# Patient Record
Sex: FEMALE | Race: WHITE | Employment: FULL TIME | URBAN - METROPOLITAN AREA
[De-identification: names, ages, dates, MRNs, and addresses within clinical notes are randomized per-mention and may not be internally consistent; named-entity substitution may affect disease eponyms.]

---

## 2017-01-05 ENCOUNTER — CLINICAL SUPPORT (OUTPATIENT)
Dept: CARDIOLOGY CLINIC | Age: 67
End: 2017-01-05

## 2017-01-05 DIAGNOSIS — E78.5 HYPERLIPIDEMIA, UNSPECIFIED HYPERLIPIDEMIA TYPE: ICD-10-CM

## 2017-01-05 DIAGNOSIS — R07.9 CHEST PAIN, UNSPECIFIED TYPE: Primary | ICD-10-CM

## 2017-01-05 DIAGNOSIS — I10 ESSENTIAL HYPERTENSION: ICD-10-CM

## 2017-01-11 ENCOUNTER — OFFICE VISIT (OUTPATIENT)
Dept: INTERNAL MEDICINE CLINIC | Age: 67
End: 2017-01-11

## 2017-01-11 VITALS
HEIGHT: 64 IN | RESPIRATION RATE: 16 BRPM | OXYGEN SATURATION: 98 % | HEART RATE: 66 BPM | TEMPERATURE: 98.6 F | SYSTOLIC BLOOD PRESSURE: 127 MMHG | WEIGHT: 158.4 LBS | DIASTOLIC BLOOD PRESSURE: 70 MMHG | BODY MASS INDEX: 27.04 KG/M2

## 2017-01-11 DIAGNOSIS — M25.511 ACUTE PAIN OF RIGHT SHOULDER: ICD-10-CM

## 2017-01-11 DIAGNOSIS — K51.80 OTHER ULCERATIVE COLITIS WITHOUT COMPLICATION (HCC): Chronic | ICD-10-CM

## 2017-01-11 DIAGNOSIS — I10 ESSENTIAL HYPERTENSION: Primary | ICD-10-CM

## 2017-01-11 DIAGNOSIS — E78.5 HYPERLIPIDEMIA, UNSPECIFIED HYPERLIPIDEMIA TYPE: ICD-10-CM

## 2017-01-11 RX ORDER — DICLOFENAC SODIUM 75 MG/1
75 TABLET, DELAYED RELEASE ORAL 2 TIMES DAILY
Qty: 601 TAB | Refills: 1 | Status: SHIPPED | OUTPATIENT
Start: 2017-01-11 | End: 2018-06-13

## 2017-01-11 NOTE — PROGRESS NOTES
HISTORY OF PRESENT ILLNESS  Samantha Shelby is a 74 y.o. female. HPI     She complains of right shoulder pain for the past 6-8 weeks. Reports it started with ache and weakness of right shoulder. She states that ache radiates down to right elbow. She does not think position of neck affects her pain. She states pain is gradually worsening and she thinks ROM is affected. Pt states her right arm feels better when she holds it closer to her body. Reports pain radiates to back as well. She describes pain as tooth ache and has used heating pads. Pt denies injury. Reports Ibuprofen takes the dull ache away. Hypertension ROS: taking medications as instructed, no medication side effects noted, no TIA's, no chest pain on exertion, no dyspnea on exertion, no swelling of ankles. New concerns: Stable- bp was 127/70 in the office today. Hyperlipidemia:  Cardiovascular risk analysis - 74 y.o. female LDL goal is under 130. ROS: taking medications as instructed, no medication side effects noted, no TIA's, no chest pain on exertion, no dyspnea on exertion, no swelling of ankles. Tolerating meds, no myalgias or other side effects noted  New concerns: Stable. Ulcerative Colitis- She is not taking any medications for Ulcerative colitis. Review of Systems   All other systems reviewed and are negative. Physical Exam   Constitutional: She is oriented to person, place, and time. She appears well-developed and well-nourished. HENT:   Head: Normocephalic and atraumatic. Right Ear: External ear normal.   Left Ear: External ear normal.   Nose: Nose normal.   Mouth/Throat: Oropharynx is clear and moist.   Eyes: Conjunctivae and EOM are normal.   Neck: Normal range of motion. Neck supple. Carotid bruit is not present. No thyroid mass and no thyromegaly present. Cardiovascular: Normal rate, regular rhythm, S1 normal, S2 normal, normal heart sounds and intact distal pulses.     Pulmonary/Chest: Effort normal and breath sounds normal.   Abdominal: Soft. Normal appearance and bowel sounds are normal. There is no hepatosplenomegaly. There is no tenderness. Musculoskeletal:        Right shoulder: She exhibits decreased range of motion and tenderness. Neurological: She is alert and oriented to person, place, and time. She has normal strength. No cranial nerve deficit or sensory deficit. Coordination normal.   Skin: Skin is warm, dry and intact. No abrasion and no rash noted. Psychiatric: She has a normal mood and affect. Her behavior is normal. Judgment and thought content normal.   Nursing note and vitals reviewed. ASSESSMENT and PLAN  Yasmine Solano was seen today for shoulder pain. Diagnoses and all orders for this visit:    Essential hypertension  BP is at goal. I do not recommend any change in medications. Hyperlipidemia, unspecified hyperlipidemia type  Stable- Continue current medications. Other ulcerative colitis without complication (HCC)  Stable and not currently taking any medications- she is followed by Dr. Kayla Jaimes. Acute pain of right shoulder  I wrote pt a prescription for physical therapy to start and suggested she see ortho. She describes pain over the tendons. Ortho may be able to find the right area to inject if this is recommended and will help her do therapy. Pt needs to continue working on ROM to avoid developing frozen shoulder. She will begin taking Voltaren 75 mg BID as an antiinflammatory- this may cause nausea and should be taken with food. I referred pt to Dr. Michele Wolf or Dr. Tatiana Gonzalez.  -     Maury Coats    Other orders  -     diclofenac EC (VOLTAREN) 75 mg EC tablet; Take 1 Tab by mouth two (2) times a day.     reviewed medications and side effects in detail     Written by Wai Bowen, as dictated by Dave Cohen MD.

## 2017-03-20 ENCOUNTER — DOCUMENTATION ONLY (OUTPATIENT)
Dept: CARDIOLOGY CLINIC | Age: 67
End: 2017-03-20

## 2017-03-20 NOTE — PROGRESS NOTES
Patient Listed on [facility] report. Sees Dr. Carol Thakkar- has appointment in April 2017.

## 2017-07-06 ENCOUNTER — OFFICE VISIT (OUTPATIENT)
Dept: CARDIOLOGY CLINIC | Age: 67
End: 2017-07-06

## 2017-07-06 ENCOUNTER — CLINICAL SUPPORT (OUTPATIENT)
Dept: CARDIOLOGY CLINIC | Age: 67
End: 2017-07-06

## 2017-07-06 VITALS
HEART RATE: 62 BPM | BODY MASS INDEX: 27.14 KG/M2 | HEIGHT: 64 IN | WEIGHT: 159 LBS | DIASTOLIC BLOOD PRESSURE: 66 MMHG | SYSTOLIC BLOOD PRESSURE: 118 MMHG

## 2017-07-06 DIAGNOSIS — I65.23 CAROTID STENOSIS, BILATERAL: Primary | ICD-10-CM

## 2017-07-06 DIAGNOSIS — E78.5 HYPERLIPIDEMIA, UNSPECIFIED HYPERLIPIDEMIA TYPE: Primary | ICD-10-CM

## 2017-07-06 DIAGNOSIS — I10 ESSENTIAL HYPERTENSION: ICD-10-CM

## 2017-07-06 RX ORDER — ROSUVASTATIN CALCIUM 10 MG/1
10 TABLET, COATED ORAL
Qty: 30 TAB | Refills: 0
Start: 2017-07-06 | End: 2018-06-13 | Stop reason: SDUPTHER

## 2017-07-06 RX ORDER — ATORVASTATIN CALCIUM 10 MG/1
TABLET, FILM COATED ORAL DAILY
COMMUNITY
End: 2017-07-06

## 2017-07-06 RX ORDER — CYANOCOBALAMIN (VITAMIN B-12) 500 MCG
600 TABLET ORAL DAILY
COMMUNITY

## 2017-07-06 NOTE — PATIENT INSTRUCTIONS

## 2017-07-06 NOTE — PROCEDURES
[facility]  *** FINAL REPORT ***    Name: Josiah Mei  MRN: [id number]       Outpatient  : 1950  HIS Order #: [id number]  [id number] [facility] Visit #: [id number]  Date: 2017    TYPE OF TEST: Cerebrovascular Duplex    REASON FOR TEST  Known carotid stenosis    Right Carotid:-             Proximal               Mid                 Distal  cm/s  Systolic  Diastolic  Systolic  Diastolic  Systolic  Diastolic  CCA:     55.1      11.0                            55.0      13.0  Bulb:    57.0      18.0  ECA:     81.0       7.0  ICA:     67.0      20.0       72.0      23.0      105.0      28.0  ICA/CCA:  1.2       1.5    ICA Stenosis: Normal    Right Vertebral:-  Finding: Antegrade  Sys:       59.0  Kelly:       18.0    Right Subclavian:    Left Carotid:-            Proximal                Mid                 Distal  cm/s  Systolic  Diastolic  Systolic  Diastolic  Systolic  Diastolic  CCA:     66.2      17.0                            78.0      17.0  Bulb:    77.0      14.0  ECA:     68.0       7.0  ICA:     66.0      17.0       73.0      21.0       92.0      26.0  ICA/CCA:  0.8       1.0    ICA Stenosis: <50%    Left Vertebral:-  Finding: Antegrade  Sys:       80.0  Kelly:       17.0    Left Subclavian:    INTERPRETATION/FINDINGS  PROCEDURE:  Evaluation of the extracranial cerebrovascular arteries  with ultrasound (B-mode imaging, pulsed Doppler, color Doppler). Includes the common carotid, internal carotid, external carotid, and  vertebral arteries. FINDINGS:    Right:  Trivial plaque is seen within the proximal internal carotid  artery. No significant stenosis is appreciated on color flow imaging  and peak systolic velocities are normal at 105 cm/s. Left:  Mild, partially calcified plaque is exhibited at the  bifurcation of the internal carotid artery. No appreciable filling  defect is noted on color flow imaging and peak systolic velocities are   normal 92 cm/s.     IMPRESSION: Findings are consistent with 0-9% stenosis of the right  internal carotid and 10-49% stenosis of the left internal carotid. Vertebrals are patent with antegrade flow. COMPARISON: In comparison to the previous study done on 11/5/2014,  there is no evidence of a significant progression of stenosis on  today's exam. Preliminary findings were discussed with the patient at  an associated office visit,    ADDITIONAL COMMENTS    I have personally reviewed the data relevant to the interpretation of  this  study.     TECHNOLOGIST: YASMEEN Spaulding  Signed: 07/06/2017 01:43 PM    PHYSICIAN: Jessica Celaya MD, [facility]  Signed: 07/06/2017 06:12 PM

## 2017-07-06 NOTE — PROGRESS NOTES
Vannesa Hugehs MD. [facility] [place]              Patient: Robin Benoit  : 1950      Today's Date: 2017            HISTORY OF PRESENT ILLNESS:     History of Present Illness:  Ms. Malia Wong is here for follow-up. No CP or SOB. No complaints. She is tolerating meds. PAST MEDICAL HISTORY:     Past Medical History:   Diagnosis Date    Agatston coronary artery calcium score less than 100     CT Heart scan  - CAC score 4    Cancer ([address] Left    breast, s/p lumpectomy    Dyslipidemia     HTN (hypertension)     Malignant melanoma nos Left    arm    Mild carotid artery disease (HCC)     Nausea & vomiting     Nausea only    Ulcerative colitis          Past Surgical History:   Procedure Laterality Date    COLONOSCOPY N/A 2016    COLONOSCOPY performed by Ani Holt MD at [facility], COLON, DIAGNOSTIC      2009 polyp    HX BREAST LUMPECTOMY Left     lumpectomy     HX  SECTION           MEDICATIONS:     Current Outpatient Prescriptions   Medication Sig Dispense Refill    cholecalciferol (VITAMIN D3) 400 unit tab tablet Take  by mouth daily.  rosuvastatin (CRESTOR) 10 mg tablet Take 1 Tab by mouth nightly. 30 Tab 0    lisinopril-hydroCHLOROthiazide (PRINZIDE, ZESTORETIC) 20-25 mg per tablet TAKE 1 TABLET BY MOUTH EVERY DAY 30 Tab 2    amLODIPine (NORVASC) 10 mg tablet TAKE 1 TABLET BY MOUTH EVERY DAY 30 Tab 2    diclofenac EC (VOLTAREN) 75 mg EC tablet Take 1 Tab by mouth two (2) times a day. 601 Tab 1    aspirin delayed-release 81 mg tablet Take  by mouth daily.  predniSONE (DELTASONE) 5 mg tablet Take  by mouth as needed. Sliding scale with flare ups.  CALCIUM PO Take  by mouth.  COLAZAL PO Take 5 tablets by mouth daily.          Allergies   Allergen Reactions    Erythromycin Hives             SOCIAL HISTORY:     Social History   Substance Use Topics    Smoking status: Never Smoker    Smokeless tobacco: Never Used    Alcohol use No FAMILY HISTORY:     Family History   Problem Relation Age of Onset    Cancer Mother      lung    Asthma Father      emphysema    Cancer Brother      melanoma    Cancer Brother      thyroid            REVIEW OF SYSTEMS:       Review of Systems:    Constitutional: Negative for fever, chills    HEENT: Negative for vision changes.    Respiratory: Negative for cough    Cardiovascular: Negative for orthopnea, syncope, and PND.    Gastrointestinal: Negative for abdominal pain, diarrhea, or melena    Genitourinary: Negative for dysuria    Musculoskeletal: Negative for myalgias.    Skin: Negative for rash    Heme: No problems bleeding.    Neurological: Negative for speech change and focal weakness.               PHYSICAL EXAM:       Physical Exam:   Visit Vitals    /66    Pulse 62    Ht 5' 4\" (1.626 m)    Wt 159 lb (72.1 kg)    BMI 27.29 kg/m2       Patient appears generally well, mood and affect are appropriate and pleasant.    HEENT: Normocephalic, atraumatic.    Neck Exam: Supple, right neck bruit    Lung Exam: Clear to auscultation, even breath sounds.    Cardiac Exam: Regular rate and rhythm with 2/6 systolic murmur (radiating to carotids)  Abdomen: Soft, non-tender  Extremities: No lower extremity edema.    Psych - appropriate affect               LABS / OTHER STUDIES:       Labs 11/21/14 - chol 284, , HDL 73, , Apo B 146, LDL-P 2626, CRP 10, proBNP 70, ASA works 5851, Cr 0.8, A1c 5, K 4.5, LFT's OK  Labs 4/9/15 - chol 199, LDL 97, , HDL 79, ApoB 83, LDL-P 1491, CRP 8, ASAworks 4623, insulin 11, Vit D 41, glc 86, Cr 0.8,   Labs 4/28/16 - chol 180, LDL 86, HDL 71, , Apo B 75, LDL-P 1281, CRP 17, ASAworks 1313, CMP OK           Lab Results   Component Value Date/Time     Sodium 143 11/14/2016 08:46 AM     Potassium 4.8 11/14/2016 08:46 AM     Chloride 100 11/14/2016 08:46 AM     CO2 24 11/14/2016 08:46 AM     Anion gap 18 04/12/2016 08:00 AM     Glucose 83 11/14/2016 08:46 AM     BUN 20 11/14/2016 08:46 AM     Creatinine 0.78 11/14/2016 08:46 AM     BUN/Creatinine ratio 26 11/14/2016 08:46 AM     GFR est AA 90 11/14/2016 08:46 AM     GFR est non-AA 78 11/14/2016 08:46 AM     Calcium 9.9 11/14/2016 08:46 AM     Bilirubin, total 0.3 11/14/2016 08:46 AM     ALT 10 11/14/2016 08:46 AM     AST 19 11/14/2016 08:46 AM     Alk. phosphatase 71 11/14/2016 08:46 AM     Protein, total 7.2 11/14/2016 08:46 AM     Albumin 4.6 11/14/2016 08:46 AM     Globulin 3.3 08/13/2010 06:54 PM     A-G Ratio 1.8 11/14/2016 08:46 AM            Lab Results   Component Value Date/Time     Cholesterol, total 182 11/14/2016 08:46 AM     HDL Cholesterol 69 11/14/2016 08:46 AM     LDL, calculated 91 11/14/2016 08:46 AM     VLDL, calculated 22 11/14/2016 08:46 AM     Triglyceride 108 11/14/2016 08:46 AM     CHOL/HDL Ratio 4.3 08/13/2010 06:54 PM             CARDIAC DIAGNOSTICS:       Cardiac Evaluation Includes:    CT Heart Scan 7/11 - CAC score 6    CT Heart scan 8/14 - CAC score 4  Carotid Dopplers 11/5/14 - 0-9% JEREMIAH and 12-46% LICA stenosis    Stress Echo 11/5/14 - walked 5 min. Normal stress EKG and stress Echo. Additional echo findings showed normal RV size and function, normal AV, MV, and TV. Trace MR and TR. PA pressure was 40 mmHg at rest and 45 mmHg after peak exercise. Echo 12/18/16 - LVEF 55-60%, AV thickening. Atrial septal aneurysm.    Treadmill Stress Test 1/5/17 - walked 4:04 (7.0 METS), normal study   Carotid Dopplers 7/6/17 - 0-9% JEREMIAH and 96-46% LICA stenosis                EKG 9/14 (reviewed by me) - NSR, Non-specific ST changes    EKG 4/28/16 - NSR, normal   EKG 12/17/16 - no acute process         ASSESSMENT AND PLAN:       Assessment and Plan:    1) History of atypical chest pain    - Very low CAC score (4) in 2014  - Stress test 1/17 was normal.   - No complaints of chest pain recently       2) Carotid Bruit    - Carotid Dopplers show stable, mild disease - 0-9% JEREMIAH and 46-26% LICA stenosis    - on a statin now      3) Dyslipidemia -    - Ms. Rain Story did initially not want to take a statin despite high lipids for years. Fortunately her CAC score has been low. Her [place] risk score gave her 8.2% 10 year CV risk.    - After much discussion, she agreed to take a statin and is tolerating it.     - Continue Crestor at 10 mg daily and ASA  - Lipids 11/16 look OK  - encouraged healthy diet and exercise      4) See me back in one year. Patient expressed understanding of the plan - questions were answered. On a side note, she has 2 kids. She is from [place]. She works for a .           Josefina Marshall MD, [facility] [address]  [address] [place] [address]  Ph: [phone]   Ph [phone]           ADDENDUM   12/21/2016  W records reviewed. Troponins negative. No ischemic EKG changes.

## 2017-08-04 RX ORDER — LISINOPRIL AND HYDROCHLOROTHIAZIDE 20; 25 MG/1; MG/1
TABLET ORAL
Qty: 90 TAB | Refills: 0 | Status: SHIPPED | OUTPATIENT
Start: 2017-08-04 | End: 2017-11-01 | Stop reason: SDUPTHER

## 2017-08-04 RX ORDER — AMLODIPINE BESYLATE 10 MG/1
TABLET ORAL
Qty: 30 TAB | Refills: 0 | Status: SHIPPED | OUTPATIENT
Start: 2017-08-04 | End: 2017-08-04 | Stop reason: SDUPTHER

## 2017-08-04 RX ORDER — LISINOPRIL AND HYDROCHLOROTHIAZIDE 20; 25 MG/1; MG/1
TABLET ORAL
Qty: 30 TAB | Refills: 0 | Status: SHIPPED | OUTPATIENT
Start: 2017-08-04 | End: 2017-08-04 | Stop reason: SDUPTHER

## 2017-08-04 RX ORDER — AMLODIPINE BESYLATE 10 MG/1
TABLET ORAL
Qty: 90 TAB | Refills: 0 | Status: SHIPPED | OUTPATIENT
Start: 2017-08-04 | End: 2017-11-01 | Stop reason: SDUPTHER

## 2017-12-13 ENCOUNTER — HOSPITAL ENCOUNTER (OUTPATIENT)
Dept: LAB | Age: 67
Discharge: HOME | End: 2017-12-13
Payer: MEDICARE

## 2017-12-13 ENCOUNTER — OFFICE VISIT (OUTPATIENT)
Dept: INTERNAL MEDICINE CLINIC | Age: 67
End: 2017-12-13

## 2017-12-13 VITALS
SYSTOLIC BLOOD PRESSURE: 132 MMHG | BODY MASS INDEX: 27.14 KG/M2 | HEIGHT: 64 IN | DIASTOLIC BLOOD PRESSURE: 72 MMHG | HEART RATE: 78 BPM | RESPIRATION RATE: 14 BRPM | WEIGHT: 159 LBS | TEMPERATURE: 98.4 F | OXYGEN SATURATION: 98 %

## 2017-12-13 DIAGNOSIS — I10 ESSENTIAL HYPERTENSION: ICD-10-CM

## 2017-12-13 DIAGNOSIS — Z78.0 POST-MENOPAUSAL: ICD-10-CM

## 2017-12-13 DIAGNOSIS — Z00.00 MEDICARE ANNUAL WELLNESS VISIT, SUBSEQUENT: Primary | ICD-10-CM

## 2017-12-13 DIAGNOSIS — K51.80 OTHER ULCERATIVE COLITIS WITHOUT COMPLICATION (HCC): Chronic | ICD-10-CM

## 2017-12-13 DIAGNOSIS — E78.5 HYPERLIPIDEMIA, UNSPECIFIED HYPERLIPIDEMIA TYPE: ICD-10-CM

## 2017-12-13 PROCEDURE — 80053 COMPREHEN METABOLIC PANEL: CPT

## 2017-12-13 PROCEDURE — 36415 COLL VENOUS BLD VENIPUNCTURE: CPT

## 2017-12-13 PROCEDURE — 85027 COMPLETE CBC AUTOMATED: CPT

## 2017-12-13 PROCEDURE — 80061 LIPID PANEL: CPT

## 2017-12-13 RX ORDER — LISINOPRIL AND HYDROCHLOROTHIAZIDE 20; 25 MG/1; MG/1
1 TABLET ORAL DAILY
Qty: 90 TAB | Refills: 1 | Status: SHIPPED | OUTPATIENT
Start: 2017-12-13 | End: 2018-06-01 | Stop reason: SDUPTHER

## 2017-12-13 RX ORDER — AMLODIPINE BESYLATE 10 MG/1
10 TABLET ORAL DAILY
Qty: 90 TAB | Refills: 1 | Status: SHIPPED | OUTPATIENT
Start: 2017-12-13 | End: 2018-06-01 | Stop reason: SDUPTHER

## 2017-12-13 NOTE — PROGRESS NOTES
HISTORY OF PRESENT ILLNESS  Marge Burrell is a 69 y.o. female. HPI   Patient reports bowel movements have been normal. She denies blood or darker stool. She continues to follow up for colonoscopies every 2-3 years. Hypertension ROS: taking medications as instructed, no medication side effects noted, no TIA's, no chest pain on exertion, no dyspnea on exertion, no swelling of ankles. New concerns:  Patient's BP in office today is 132/72. Patient states BP has been overall stable and has not been fluctuating. She denies SOB or chest tightness. Patient continues Lisinopril/HCTZ and amlodipine. Hyperlipidemia:  Cardiovascular risk analysis - 69 y.o. female LDL goal is under 130. ROS: taking medications as instructed, no medication side effects noted, no TIA's, no chest pain on exertion, no dyspnea on exertion, no swelling of ankles. Tolerating meds, no myalgias or other side effects noted  New concerns: Patient continues Crestor. Last LDL was 91. Review of Systems   All other systems reviewed and are negative. Physical Exam   Constitutional: She is oriented to person, place, and time. She appears well-developed and well-nourished. HENT:   Head: Normocephalic and atraumatic. Right Ear: External ear normal.   Left Ear: External ear normal.   Nose: Nose normal.   Mouth/Throat: Oropharynx is clear and moist.   Fluid in ears bilaterally. Eyes: Conjunctivae and EOM are normal.   Neck: Normal range of motion. Neck supple. Carotid bruit is not present. No thyroid mass and no thyromegaly present. Cardiovascular: Normal rate, regular rhythm, S1 normal, S2 normal, normal heart sounds and intact distal pulses. Pulmonary/Chest: Effort normal and breath sounds normal.   Abdominal: Soft. Normal appearance and bowel sounds are normal. There is no hepatosplenomegaly. There is no tenderness. Musculoskeletal: Normal range of motion. Neurological: She is alert and oriented to person, place, and time.  She has normal strength. No cranial nerve deficit or sensory deficit. Coordination normal.   Skin: Skin is warm, dry and intact. No abrasion and no rash noted. Psychiatric: She has a normal mood and affect. Her behavior is normal. Judgment and thought content normal.   Nursing note and vitals reviewed. ASSESSMENT and PLAN  Diagnoses and all orders for this visit:    1. Medicare annual wellness visit, subsequent    2. Hyperlipidemia, unspecified hyperlipidemia type  Stable, patient tolerating meds, no myalgias. I do not recommend any change in medications. Continue to follow up with cardiology. -     lisinopril-hydroCHLOROthiazide (PRINZIDE, ZESTORETIC) 20-25 mg per tablet; Take 1 Tab by mouth daily. -     amLODIPine (NORVASC) 10 mg tablet; Take 1 Tab by mouth daily.  -     METABOLIC PANEL, COMPREHENSIVE  -     LIPID PANEL    3. Essential hypertension  BP is at goal. I do not recommend any change in medications. 4. Other ulcerative colitis without complication ([facility]  Overall well managed. No acute blood in stool, continue to follow up with GI.   -     CBC W/O DIFF    5. Post-menopausal  Patient due for imaging and will follow up. -     DEXA BONE DENSITY STUDY AXIAL; Future    When she turns 77, patient will consider pneumonia vaccines. lab results and schedule of future lab studies reviewed with patient  reviewed diet, exercise and weight control    Written by Thao Perez, as dictated by Quinten Roper MD.     Current diagnosis and concerns discussed with pt at length. Understands risks and benefits or current treatment plan and medications and accepts the treatment and medication with any possible risks. Pt asks appropriate questions which were answered. Pt instructed to call with any concerns or problems.

## 2017-12-13 NOTE — PATIENT INSTRUCTIONS

## 2017-12-13 NOTE — ACP (ADVANCE CARE PLANNING)
Advance Care Planning (ACP) Provider Note - Comprehensive     Date of ACP Conversation: 12/13/17  Persons included in Conversation:  patient  Length of ACP Conversation in minutes:  <16 minutes (Non-Billable)    Authorized Decision Maker (if patient is incapable of making informed decisions): This person is: Other Legally Authorized Decision Maker (e.g. Next of Kin)          General ACP for ALL Patients with Decision Making Capacity:   Importance of advance care planning, including choosing a healthcare agent to communicate patient's healthcare decisions if patient lost the ability to make decisions, such as after a sudden illness or accident    Review of Existing Advance Directive:  Does this advance directive still reflect your preferences?   Yes (Provide new form/Refer for assistance in updating)    For Serious or Chronic Illness:  No known illness    Interventions Provided:  Reviewed existing Advance Directive

## 2017-12-13 NOTE — PROGRESS NOTES
This is a Subsequent Medicare Annual Wellness Exam (AWV) (Performed 12 months after IPPE or effective date of Medicare Part B enrollment)    I have reviewed the patient's medical history in detail and updated the computerized patient record. History     Past Medical History:   Diagnosis Date    Agatston coronary artery calcium score less than 100     CT Heart scan  - CAC score 4    Cancer ([address] Left    breast, s/p lumpectomy    Dyslipidemia     HTN (hypertension)     Malignant melanoma nos Left    arm    Mild carotid artery disease (HCC)     Nausea & vomiting     Nausea only    Ulcerative colitis       Past Surgical History:   Procedure Laterality Date    COLONOSCOPY N/A 2016    COLONOSCOPY performed by Margot Arndt MD at [address] [place] [address] [place], COLON, DIAGNOSTIC       polyp    HX BREAST LUMPECTOMY Left     lumpectomy     HX  SECTION       Current Outpatient Prescriptions   Medication Sig Dispense Refill    lisinopril-hydroCHLOROthiazide (PRINZIDE, ZESTORETIC) 20-25 mg per tablet TAKE 1 TABLET BY MOUTH EVERY DAY 14 Tab 0    amLODIPine (NORVASC) 10 mg tablet TAKE 1 TABLET BY MOUTH EVERY DAY 14 Tab 0    cholecalciferol (VITAMIN D3) 400 unit tab tablet Take  by mouth daily.  rosuvastatin (CRESTOR) 10 mg tablet Take 1 Tab by mouth nightly. 30 Tab 0    diclofenac EC (VOLTAREN) 75 mg EC tablet Take 1 Tab by mouth two (2) times a day. 601 Tab 1    aspirin delayed-release 81 mg tablet Take  by mouth daily.  predniSONE (DELTASONE) 5 mg tablet Take  by mouth as needed. Sliding scale with flare ups.  CALCIUM PO Take  by mouth.  COLAZAL PO Take 5 tablets by mouth daily.       rosuvastatin (CRESTOR) 10 mg tablet TAKE 1 TABLET BY MOUTH DAILY 30 Tab 8     Allergies   Allergen Reactions    Erythromycin Hives     Family History   Problem Relation Age of Onset    Cancer Mother      lung    Asthma Father      emphysema    Cancer Brother      melanoma    Cancer Brother      thyroid     Social History   Substance Use Topics    Smoking status: Never Smoker    Smokeless tobacco: Never Used    Alcohol use No     Patient Active Problem List   Diagnosis Code    Breast cancer ([facility] C50.919    Hyperlipidemia E78.5    Ulcerative colitis ([facility] K51.90    Mild carotid artery disease ([facility] I77.9    Essential hypertension I10    ACP (advance care planning) Z71.89       Depression Risk Factor Screening:     PHQ over the last two weeks 1/27/2016   Little interest or pleasure in doing things Not at all   Feeling down, depressed or hopeless Not at all   Total Score PHQ 2 0     Alcohol Risk Factor Screening: You do not drink alcohol or very rarely. Functional Ability and Level of Safety:   Hearing Loss  Hearing is good. Activities of Daily Living  The home contains: no safety equipment. Patient does total self care    Fall RiskFall Risk Assessment, last 12 mths 1/27/2016   Able to walk? Yes   Fall in past 12 months? No       Abuse Screen  Patient is not abused    Cognitive Screening   Evaluation of Cognitive Function:  Has your family/caregiver stated any concerns about your memory: no  Normal    Patient Care Team   Patient Care Team:  Abhay Cabral MD as PCP - Neeru Oliva MD (Gastroenterology)  Ziyad Anders MD as Consulting Provider (Cardiology)  Ziyad Anders MD (Cardiology)    Assessment/Plan   Education and counseling provided:  Are appropriate based on today's review and evaluation  End-of-Life planning (with patient's consent)     She did promise pneumonia shot when she turns 77! Diagnoses and all orders for this visit:    1. Medicare annual wellness visit, subsequent    2. Hyperlipidemia, unspecified hyperlipidemia type    3. Essential hypertension    4.  Other ulcerative colitis without complication [facility])        Health Maintenance Due   Topic Date Due    Pneumococcal 65+ High/Highest Risk (2 of 2 - PPSV23) 01/09/2017    Influenza Age 9 to Adult  08/01/2017    MEDICARE YEARLY EXAM  11/15/2017

## 2017-12-14 LAB
ALBUMIN SERPL-MCNC: 4.9 G/DL (ref 3.6–4.8)
ALBUMIN/GLOB SERPL: 2 {RATIO} (ref 1.2–2.2)
ALP SERPL-CCNC: 69 IU/L (ref 39–117)
ALT SERPL-CCNC: 9 IU/L (ref 0–32)
AST SERPL-CCNC: 15 IU/L (ref 0–40)
BILIRUB SERPL-MCNC: 0.3 MG/DL (ref 0–1.2)
BUN SERPL-MCNC: 18 MG/DL (ref 8–27)
BUN/CREAT SERPL: 23 (ref 12–28)
CALCIUM SERPL-MCNC: 10.1 MG/DL (ref 8.7–10.3)
CHLORIDE SERPL-SCNC: 100 MMOL/L (ref 96–106)
CHOLEST SERPL-MCNC: 174 MG/DL (ref 100–199)
CO2 SERPL-SCNC: 26 MMOL/L (ref 18–29)
CREAT SERPL-MCNC: 0.78 MG/DL (ref 0.57–1)
ERYTHROCYTE [DISTWIDTH] IN BLOOD BY AUTOMATED COUNT: 14.4 % (ref 12.3–15.4)
GFR SERPLBLD CREATININE-BSD FMLA CKD-EPI: 78 ML/MIN/1.73
GFR SERPLBLD CREATININE-BSD FMLA CKD-EPI: 90 ML/MIN/1.73
GLOBULIN SER CALC-MCNC: 2.4 G/DL (ref 1.5–4.5)
GLUCOSE SERPL-MCNC: 83 MG/DL (ref 65–99)
HCT VFR BLD AUTO: 45.9 % (ref 34–46.6)
HDLC SERPL-MCNC: 74 MG/DL
HGB BLD-MCNC: 14.8 G/DL (ref 11.1–15.9)
INTERPRETATION, 910389: NORMAL
LDLC SERPL CALC-MCNC: 80 MG/DL (ref 0–99)
MCH RBC QN AUTO: 28.2 PG (ref 26.6–33)
MCHC RBC AUTO-ENTMCNC: 32.2 G/DL (ref 31.5–35.7)
MCV RBC AUTO: 87 FL (ref 79–97)
PLATELET # BLD AUTO: 368 X10E3/UL (ref 150–379)
POTASSIUM SERPL-SCNC: 4.6 MMOL/L (ref 3.5–5.2)
PROT SERPL-MCNC: 7.3 G/DL (ref 6–8.5)
RBC # BLD AUTO: 5.25 X10E6/UL (ref 3.77–5.28)
SODIUM SERPL-SCNC: 145 MMOL/L (ref 134–144)
TRIGL SERPL-MCNC: 99 MG/DL (ref 0–149)
VLDLC SERPL CALC-MCNC: 20 MG/DL (ref 5–40)
WBC # BLD AUTO: 6.4 X10E3/UL (ref 3.4–10.8)

## 2018-01-09 ENCOUNTER — HOSPITAL ENCOUNTER (OUTPATIENT)
Dept: MAMMOGRAPHY | Age: 68
Discharge: HOME | End: 2018-01-09
Attending: INTERNAL MEDICINE
Payer: MEDICARE

## 2018-01-09 DIAGNOSIS — Z78.0 POST-MENOPAUSAL: ICD-10-CM

## 2018-01-09 PROCEDURE — 77080 DXA BONE DENSITY AXIAL: CPT

## 2018-03-14 ENCOUNTER — OFFICE VISIT (OUTPATIENT)
Dept: INTERNAL MEDICINE CLINIC | Age: 68
End: 2018-03-14

## 2018-03-14 VITALS
DIASTOLIC BLOOD PRESSURE: 72 MMHG | OXYGEN SATURATION: 91 % | BODY MASS INDEX: 26.8 KG/M2 | SYSTOLIC BLOOD PRESSURE: 125 MMHG | HEART RATE: 91 BPM | HEIGHT: 64 IN | TEMPERATURE: 98.1 F | WEIGHT: 157 LBS | RESPIRATION RATE: 16 BRPM

## 2018-03-14 DIAGNOSIS — R42 VERTIGO: Primary | ICD-10-CM

## 2018-03-14 DIAGNOSIS — I10 ESSENTIAL HYPERTENSION: ICD-10-CM

## 2018-03-14 RX ORDER — MECLIZINE HYDROCHLORIDE 25 MG/1
25 TABLET ORAL
Qty: 30 TAB | Refills: 0 | Status: SHIPPED | OUTPATIENT
Start: 2018-03-14 | End: 2018-04-05 | Stop reason: SDUPTHER

## 2018-03-14 NOTE — PROGRESS NOTES
HISTORY OF PRESENT ILLNESS  Waynette Hodgkins is a 69 y.o. female. HPI  Monday early woke to go to the bathroom and felt off balance, went back to bed, however when she woke up again still felt dizzy. No nausea or vomiting. No palpitations or chest pain. She's tried some Dramamine, which helps a bit . She has a mild headache, nothing severe. No blurring or diplopia. Review of Systems   Constitutional: Negative for chills, fever and weight loss. Respiratory: Negative for cough, shortness of breath and wheezing. Cardiovascular: Negative for chest pain, palpitations, orthopnea, leg swelling and PND. Gastrointestinal: Negative for heartburn and nausea. Musculoskeletal: Negative for myalgias. Neurological: Positive for dizziness. Negative for sensory change, speech change, focal weakness and headaches. Physical Exam   Constitutional: She is oriented to person, place, and time. She appears well-developed and well-nourished. HENT:   Head: Normocephalic and atraumatic. Eyes:   Horizontal nystagmus   Neck: Normal range of motion. Neck supple. Carotid bruit is not present. No thyromegaly present. Cardiovascular: Normal rate, regular rhythm, S1 normal, S2 normal, normal heart sounds and intact distal pulses. No murmur heard. Pulmonary/Chest: Effort normal and breath sounds normal. No respiratory distress. She has no wheezes. She has no rales. Musculoskeletal: She exhibits no edema. Neurological: She is alert and oriented to person, place, and time. nonfocal  Peripheral vision intact and cerebellar testing ok   Psychiatric: She has a normal mood and affect. Her behavior is normal.   Nursing note and vitals reviewed. ASSESSMENT and PLAN  Diagnoses and all orders for this visit:    1. Vertigo  -     meclizine (ANTIVERT) 25 mg tablet; Take 1 Tab by mouth three (3) times daily as needed for up to 10 days.     2. Essential hypertension    Stable  Follow up if signs and symptoms worsen or change. After hours number given.    Cautioned re sedation with the antivert

## 2018-03-14 NOTE — MR AVS SNAPSHOT
[address] [facility] 
 
 
 [address] [facility] 1007 [facility] 
[phone] Patient: Richmond Rasmussen MRN: [id number] YPC:[birth year] Visit Information Date & Time Provider Department Dept. Phone Encounter #  
 3/14/2018  1:30 PM Maria G Adams MD [facility] [facility] [id number] Your Appointments 6/13/2018  8:00 AM  
ROUTINE CARE with Adolph Sanchez MD  
[facility] [address] [place] [address]) Appt Note: 6 mo  
 [address] [facility] [address] [address]  
[phone]  
  
   
 [address] [facility] [address] 7/17/2018  8:20 AM  
ESTABLISHED PATIENT with Genesis Lopez MD  
[facility] ([facility] SCHEDULING) Appt Note: annual  
 [address] [facility] [address]7 [facility]  
[address] [facility] [address] [address] [address] [place] [address] Upcoming Health Maintenance Date Due  
 MEDICARE YEARLY EXAM 12/14/2018 BREAST CANCER SCRN MAMMOGRAM 4/19/2019 GLAUCOMA SCREENING Q2Y 5/19/2019 COLONOSCOPY 6/16/2026 DTaP/Tdap/Td series (2 - Td) 11/14/2026 Allergies as of 3/14/2018  Review Complete On: 3/14/2018 By: Maria G Adams MD  
  
 Severity Noted Reaction Type Reactions Erythromycin  02/10/2009    Hives Current Immunizations  Never Reviewed No immunizations on file. Not reviewed this visit You Were Diagnosed With   
  
 Codes Comments Vertigo    -  Primary ICD-10-CM: E82 ICD-9-CM: 780.4 Essential hypertension     ICD-10-CM: I10 
ICD-9-CM: 401.9 Vitals BP Pulse Temp Resp Height(growth percentile) Weight(growth percentile) 125/72 (BP 1 Location: Left arm, BP Patient Position: Standing) 91 98.1 °F (36.7 °C) (Oral) 16 5' 4\" (1.626 m) 157 lb (71.2 kg) SpO2 BMI OB Status Smoking Status 91% 26.95 kg/m2 Postmenopausal Never Smoker Vitals History BMI and BSA Data Body Mass Index Body Surface Area  
 26.95 kg/m 2 1.79 m 2 Preferred Pharmacy Pharmacy Name Phone [facility]ORE [address], [address] [facility] [address]  [facility] [phone] Your Updated Medication List  
  
   
This list is accurate as of 3/14/18  2:01 PM.  Always use your most recent med list. amLODIPine 10 mg tablet Commonly known as:  Cookie Boozer Take 1 Tab by mouth daily. aspirin delayed-release 81 mg tablet Take  by mouth daily. CALCIUM PO Take  by mouth. cholecalciferol 400 unit Tab tablet Commonly known as:  VITAMIN D3 Take  by mouth daily. COLAZAL PO Take 5 tablets by mouth daily. diclofenac EC 75 mg EC tablet Commonly known as:  VOLTAREN Take 1 Tab by mouth two (2) times a day. lisinopril-hydroCHLOROthiazide 20-25 mg per tablet Commonly known as:  Florian Mp Take 1 Tab by mouth daily. meclizine 25 mg tablet Commonly known as:  ANTIVERT Take 1 Tab by mouth three (3) times daily as needed for up to 10 days. predniSONE 5 mg tablet Commonly known as:  Geovanna Raider Take  by mouth as needed. Sliding scale with flare ups. * rosuvastatin 10 mg tablet Commonly known as:  CRESTOR Take 1 Tab by mouth nightly. * rosuvastatin 10 mg tablet Commonly known as:  CRESTOR  
TAKE 1 TABLET BY MOUTH DAILY * Notice: This list has 2 medication(s) that are the same as other medications prescribed for you. Read the directions carefully, and ask your doctor or other care provider to review them with you. Prescriptions Sent to Pharmacy Refills  
 meclizine (ANTIVERT) 25 mg tablet 0 Sig: Take 1 Tab by mouth three (3) times daily as needed for up to 10 days.   
 Class: Normal  
 Pharmacy: [address] [place] [address] [address], [address] [facility] [address] AT NEC of [address] [facility] #: [phone] Route: Oral  
  
Introducing hospitals & HEALTH SERVICES! Tosha Perez introduces [employer] patient portal. Now you can access parts of your medical record, email your doctor's office, and request medication refills online. 1. In your internet browser, go to [email or web address] 2. Click on the First Time User? Click Here link in the Sign In box. You will see the New Member Sign Up page. 3. Enter your [employer] Access Code exactly as it appears below. You will not need to use this code after youve completed the sign-up process. If you do not sign up before the expiration date, you must request a new code. · [employer] Access Code: [email or web address] Expires: 4/8/2018  1:12 PM 
 
4. Enter the last four digits of your Social Security Number (xxxx) and Date of Birth (mm/dd/yyyy) as indicated and click Submit. You will be taken to the next sign-up page. 5. Create a [employer] ID. This will be your [employer] login ID and cannot be changed, so think of one that is secure and easy to remember. 6. Create a [employer] password. You can change your password at any time. 7. Enter your Password Reset Question and Answer. This can be used at a later time if you forget your password. 8. Enter your e-mail address. You will receive e-mail notification when new information is available in [address] 9. Click Sign Up. You can now view and download portions of your medical record. 10. Click the Download Summary menu link to download a portable copy of your medical information. If you have questions, please visit the Frequently Asked Questions section of the [employer] website. Remember, [employer] is NOT to be used for urgent needs. For medical emergencies, dial 911. Now available from your iPhone and Android! Please provide this summary of care documentation to your next provider. Your primary care clinician is listed as Melissa Pérez. If you have any questions after today's visit, please call [phone].

## 2018-04-05 DIAGNOSIS — R42 VERTIGO: ICD-10-CM

## 2018-04-05 RX ORDER — MECLIZINE HYDROCHLORIDE 25 MG/1
TABLET ORAL
Qty: 30 TAB | Refills: 0 | Status: SHIPPED | OUTPATIENT
Start: 2018-04-05 | End: 2018-06-23 | Stop reason: SDUPTHER

## 2018-06-01 DIAGNOSIS — E78.5 HYPERLIPIDEMIA, UNSPECIFIED HYPERLIPIDEMIA TYPE: ICD-10-CM

## 2018-06-02 RX ORDER — LISINOPRIL AND HYDROCHLOROTHIAZIDE 20; 25 MG/1; MG/1
TABLET ORAL
Qty: 90 TAB | Refills: 0 | Status: SHIPPED | OUTPATIENT
Start: 2018-06-02 | End: 2018-06-13 | Stop reason: SDUPTHER

## 2018-06-02 RX ORDER — AMLODIPINE BESYLATE 10 MG/1
TABLET ORAL
Qty: 90 TAB | Refills: 0 | Status: SHIPPED | OUTPATIENT
Start: 2018-06-02 | End: 2018-06-13 | Stop reason: SDUPTHER

## 2018-06-13 ENCOUNTER — HOSPITAL ENCOUNTER (OUTPATIENT)
Dept: LAB | Age: 68
Discharge: HOME | End: 2018-06-13
Payer: MEDICARE

## 2018-06-13 ENCOUNTER — OFFICE VISIT (OUTPATIENT)
Dept: INTERNAL MEDICINE CLINIC | Age: 68
End: 2018-06-13

## 2018-06-13 VITALS
BODY MASS INDEX: 26.84 KG/M2 | HEIGHT: 64 IN | HEART RATE: 65 BPM | TEMPERATURE: 98.4 F | SYSTOLIC BLOOD PRESSURE: 128 MMHG | WEIGHT: 157.2 LBS | DIASTOLIC BLOOD PRESSURE: 72 MMHG | RESPIRATION RATE: 14 BRPM | OXYGEN SATURATION: 97 %

## 2018-06-13 DIAGNOSIS — E78.5 HYPERLIPIDEMIA, UNSPECIFIED HYPERLIPIDEMIA TYPE: ICD-10-CM

## 2018-06-13 DIAGNOSIS — K51.80 OTHER ULCERATIVE COLITIS WITHOUT COMPLICATION (HCC): Primary | Chronic | ICD-10-CM

## 2018-06-13 DIAGNOSIS — I10 ESSENTIAL HYPERTENSION: ICD-10-CM

## 2018-06-13 PROCEDURE — 36415 COLL VENOUS BLD VENIPUNCTURE: CPT

## 2018-06-13 PROCEDURE — 80061 LIPID PANEL: CPT

## 2018-06-13 PROCEDURE — 80053 COMPREHEN METABOLIC PANEL: CPT

## 2018-06-13 PROCEDURE — 85025 COMPLETE CBC W/AUTO DIFF WBC: CPT

## 2018-06-13 RX ORDER — LISINOPRIL AND HYDROCHLOROTHIAZIDE 20; 25 MG/1; MG/1
TABLET ORAL
Qty: 90 TAB | Refills: 1 | Status: SHIPPED | OUTPATIENT
Start: 2018-06-13 | End: 2019-03-11 | Stop reason: SDUPTHER

## 2018-06-13 RX ORDER — AMLODIPINE BESYLATE 10 MG/1
TABLET ORAL
Qty: 90 TAB | Refills: 1 | Status: SHIPPED | OUTPATIENT
Start: 2018-06-13 | End: 2019-01-07 | Stop reason: SDUPTHER

## 2018-06-14 LAB
ALBUMIN SERPL-MCNC: 4.4 G/DL (ref 3.6–4.8)
ALBUMIN/GLOB SERPL: 1.8 {RATIO} (ref 1.2–2.2)
ALP SERPL-CCNC: 60 IU/L (ref 39–117)
ALT SERPL-CCNC: 15 IU/L (ref 0–32)
AST SERPL-CCNC: 24 IU/L (ref 0–40)
BASOPHILS # BLD AUTO: 0.1 X10E3/UL (ref 0–0.2)
BASOPHILS NFR BLD AUTO: 1 %
BILIRUB SERPL-MCNC: 0.3 MG/DL (ref 0–1.2)
BUN SERPL-MCNC: 22 MG/DL (ref 8–27)
BUN/CREAT SERPL: 28 (ref 12–28)
CALCIUM SERPL-MCNC: 9.7 MG/DL (ref 8.7–10.3)
CHLORIDE SERPL-SCNC: 98 MMOL/L (ref 96–106)
CHOLEST SERPL-MCNC: 193 MG/DL (ref 100–199)
CO2 SERPL-SCNC: 23 MMOL/L (ref 20–29)
CREAT SERPL-MCNC: 0.79 MG/DL (ref 0.57–1)
EOSINOPHIL # BLD AUTO: 0.3 X10E3/UL (ref 0–0.4)
EOSINOPHIL NFR BLD AUTO: 3 %
ERYTHROCYTE [DISTWIDTH] IN BLOOD BY AUTOMATED COUNT: 14.4 % (ref 12.3–15.4)
GFR SERPLBLD CREATININE-BSD FMLA CKD-EPI: 77 ML/MIN/1.73
GFR SERPLBLD CREATININE-BSD FMLA CKD-EPI: 88 ML/MIN/1.73
GLOBULIN SER CALC-MCNC: 2.5 G/DL (ref 1.5–4.5)
GLUCOSE SERPL-MCNC: 73 MG/DL (ref 65–99)
HCT VFR BLD AUTO: 43.6 % (ref 34–46.6)
HDLC SERPL-MCNC: 81 MG/DL
HGB BLD-MCNC: 14.5 G/DL (ref 11.1–15.9)
IMM GRANULOCYTES # BLD: 0.1 X10E3/UL (ref 0–0.1)
IMM GRANULOCYTES NFR BLD: 1 %
INTERPRETATION, 910389: NORMAL
LDLC SERPL CALC-MCNC: 80 MG/DL (ref 0–99)
LYMPHOCYTES # BLD AUTO: 2.5 X10E3/UL (ref 0.7–3.1)
LYMPHOCYTES NFR BLD AUTO: 27 %
MCH RBC QN AUTO: 28 PG (ref 26.6–33)
MCHC RBC AUTO-ENTMCNC: 33.3 G/DL (ref 31.5–35.7)
MCV RBC AUTO: 84 FL (ref 79–97)
MONOCYTES # BLD AUTO: 0.8 X10E3/UL (ref 0.1–0.9)
MONOCYTES NFR BLD AUTO: 9 %
NEUTROPHILS # BLD AUTO: 5.4 X10E3/UL (ref 1.4–7)
NEUTROPHILS NFR BLD AUTO: 59 %
PLATELET # BLD AUTO: 431 X10E3/UL (ref 150–379)
POTASSIUM SERPL-SCNC: 4.7 MMOL/L (ref 3.5–5.2)
PROT SERPL-MCNC: 6.9 G/DL (ref 6–8.5)
RBC # BLD AUTO: 5.17 X10E6/UL (ref 3.77–5.28)
SODIUM SERPL-SCNC: 140 MMOL/L (ref 134–144)
TRIGL SERPL-MCNC: 159 MG/DL (ref 0–149)
VLDLC SERPL CALC-MCNC: 32 MG/DL (ref 5–40)
WBC # BLD AUTO: 9.2 X10E3/UL (ref 3.4–10.8)

## 2018-07-17 ENCOUNTER — OFFICE VISIT (OUTPATIENT)
Dept: CARDIOLOGY CLINIC | Age: 68
End: 2018-07-17

## 2018-07-17 VITALS — BODY MASS INDEX: 26.98 KG/M2 | HEIGHT: 64 IN | WEIGHT: 158 LBS

## 2018-07-17 DIAGNOSIS — I10 ESSENTIAL HYPERTENSION: Primary | ICD-10-CM

## 2018-07-17 DIAGNOSIS — E78.5 HYPERLIPIDEMIA, UNSPECIFIED HYPERLIPIDEMIA TYPE: ICD-10-CM

## 2018-07-17 RX ORDER — ROSUVASTATIN CALCIUM 10 MG/1
10 TABLET, COATED ORAL DAILY
Qty: 90 TAB | Refills: 3 | Status: SHIPPED | OUTPATIENT
Start: 2018-07-17 | End: 2019-07-08 | Stop reason: SDUPTHER

## 2018-07-17 NOTE — PROGRESS NOTES
Extended / Orthostatic Vitals:  Patient Position 2: Supine  BP 2: 120/60  Pulse 2: 76  Patient Position 3: Standing  BP 3: 112/72  Pulse 3: 78

## 2018-07-17 NOTE — PROGRESS NOTES
Christine Archibald MD. [facility]              Patient: Susannah Mosher  : 1950      Today's Date: 2018        HISTORY OF PRESENT ILLNESS:     History of Present Illness:  Here for follow-up. Doing well. No cardiac complaints. Had vertigo several weeks ago (room spinning). PAST MEDICAL HISTORY:     Past Medical History:   Diagnosis Date    [facility] calcium [facility] less than [facility]     CT Heart scan  - CAC score 4    Cancer ([address] Left    breast, s/p lumpectomy    Dyslipidemia     Labs 14 - chol 284, , HDL 73, , Apo B 146, LDL-P 2626, CRP 10    HTN (hypertension)     Malignant melanoma nos Left    arm    Mild carotid artery disease (HCC)     Nausea & vomiting     Nausea only    Ulcerative colitis          Past Surgical History:   Procedure Laterality Date    COLONOSCOPY N/A 2016    COLONOSCOPY performed by Deric Rice MD at [address] [place] [address], COLON, DIAGNOSTIC       polyp    HX BREAST LUMPECTOMY Left     lumpectomy     HX  SECTION             MEDICATIONS:     Current Outpatient Prescriptions   Medication Sig Dispense Refill    meclizine (ANTIVERT) 25 mg tablet TAKE 1 TABLET BY MOUTH THREE TIMES DAILY FOR UP TO 10 DAYS AS NEEDED 30 Tab 3    amLODIPine (NORVASC) 10 mg tablet TAKE 1 TABLET BY MOUTH DAILY 90 Tab 1    lisinopril-hydroCHLOROthiazide (PRINZIDE, ZESTORETIC) 20-25 mg per tablet TAKE 1 TABLET BY MOUTH ONCE DAILY 90 Tab 1    rosuvastatin (CRESTOR) 10 mg tablet TAKE 1 TABLET BY MOUTH DAILY 30 Tab 8    cholecalciferol (VITAMIN D3) 400 unit tab tablet Take  by mouth daily.  aspirin delayed-release 81 mg tablet Take  by mouth daily.  predniSONE (DELTASONE) 5 mg tablet Take  by mouth as needed. Sliding scale with flare ups.  CALCIUM PO Take  by mouth.  COLAZAL PO Take 5 tablets by mouth daily.          Allergies   Allergen Reactions    Erythromycin Hives             SOCIAL HISTORY: Social History   Substance Use Topics    Smoking status: Never Smoker    Smokeless tobacco: Never Used    Alcohol use No         FAMILY HISTORY:     Family History   Problem Relation Age of Onset    Cancer Mother      lung    Asthma Father      emphysema    Cancer Brother      melanoma    Cancer Brother      thyroid          REVIEW OF SYSTEMS:       Review of Systems:    Constitutional: Negative for fever, chills    HEENT: Negative for vision changes.    Respiratory: Negative for cough    Cardiovascular: Negative for orthopnea, syncope, and PND.    Gastrointestinal: Negative for abdominal pain, diarrhea, or melena    Genitourinary: Negative for dysuria    Musculoskeletal: Negative for myalgias.    Skin: Negative for rash    Heme: No problems bleeding.    Neurological: Negative for speech change and focal weakness.                 PHYSICAL EXAM:     Physical Exam:  Visit Vitals    Ht 5' 4\" (1.626 m)    Wt 158 lb (71.7 kg)    BMI 27.12 kg/m2     Patient appears generally well, mood and affect are appropriate and pleasant. HEENT:  Hearing intact, non-icteric, normocephalic, atraumatic. Neck Exam: Supple, No JVD   Lung Exam: Clear to auscultation, even breath sounds. Cardiac Exam: Regular rate and rhythm with 2/6 systolic murmur (radiating to carotids)  Abdomen: Soft, non-tender, normal bowel sounds. No bruits or masses. Extremities: Moves all ext well. No lower extremity edema. Vascular: 2+ dorsalis pedis pulses bilaterally.   Psych: Appropriate affect  Neuro - Grossly intact      Extended / Orthostatic Vitals:  Patient Position 2: Supine  BP 2: 120/60  Pulse 2: 76  Patient Position 3: Standing  BP 3: 112/72  Pulse 3: 78            LABS / OTHER STUDIES:       Labs 11/21/14 - chol 284, , HDL 73, , Apo B 146, LDL-P 2626, CRP 10, proBNP 70, ASA works 8768, Cr 0.8, A1c 5, K 4.5, LFT's OK    Lab Results   Component Value Date/Time    Sodium 140 06/13/2018 08:40 AM    Potassium 4.7 06/13/2018 08:40 AM    Chloride 98 06/13/2018 08:40 AM    CO2 23 06/13/2018 08:40 AM    Anion gap 18 04/12/2016 08:00 AM    Glucose 73 06/13/2018 08:40 AM    BUN 22 06/13/2018 08:40 AM    Creatinine 0.79 06/13/2018 08:40 AM    BUN/Creatinine ratio 28 06/13/2018 08:40 AM    GFR est AA 88 06/13/2018 08:40 AM    GFR est non-AA 77 06/13/2018 08:40 AM    Calcium 9.7 06/13/2018 08:40 AM    Bilirubin, total 0.3 06/13/2018 08:40 AM    AST (SGOT) 24 06/13/2018 08:40 AM    Alk. phosphatase 60 06/13/2018 08:40 AM    Protein, total 6.9 06/13/2018 08:40 AM    Albumin 4.4 06/13/2018 08:40 AM    Globulin 3.3 08/13/2010 06:54 PM    A-G Ratio 1.8 06/13/2018 08:40 AM    ALT (SGPT) 15 06/13/2018 08:40 AM       Lab Results   Component Value Date/Time    Cholesterol, total 193 06/13/2018 08:40 AM    HDL Cholesterol 81 06/13/2018 08:40 AM    LDL, calculated 80 06/13/2018 08:40 AM    VLDL, calculated 32 06/13/2018 08:40 AM    Triglyceride 159 (H) 06/13/2018 08:40 AM    CHOL/HDL Ratio 4.3 08/13/2010 06:54 PM               CARDIAC DIAGNOSTICS:       Cardiac Evaluation Includes:    CT Heart Scan 7/11 - CAC score 6    CT Heart scan 8/14 - CAC score 4  Carotid Dopplers 11/5/14 - 0-9% JEREMIAH and 78-45% LICA stenosis    Stress Echo 11/5/14 - walked 5 min. Normal stress EKG and stress Echo. Additional echo findings showed normal RV size and function, normal AV, MV, and TV. Trace MR and TR. PA pressure was 40 mmHg at rest and 45 mmHg after peak exercise. Echo 12/18/16 - LVEF 55-60%, AV thickening.   Atrial septal aneurysm.    Treadmill Stress Test 1/5/17 - walked 4:04 (7.0 METS), normal study   Carotid Dopplers 7/6/17 - 0-9% JEREMIAH and 13-74% LICA stenosis               EKG 9/14 (reviewed by me) - NSR, Non-specific ST changes    EKG 4/28/16 - NSR, normal   EKG 7/17/18 - NSR, non- non-specific T wave abn           ASSESSMENT AND PLAN:       Assessment and Plan:    1) History of atypical chest pain    - Very low CAC score (4) in 2014  - Stress test 1/17 was normal.   - No complaints of chest pain recently       2) HTN  - BP looks good - continue meds       3) Dyslipidemia -    - Labs 11/21/14 - chol 284, , HDL 73, , Apo B 146, LDL-P 2626, CRP 10  - Ms. ALEX did initially not want to take a statin despite high lipids for years. Fortunately her CAC score has been low. Her Comptche risk score gave her 8.2% 10 year CV risk.    - After much discussion, she agreed to take a statin and is tolerating it.     - Continue Crestor at 10 mg daily and ASA  - Recent lipids look good   - encouraged healthy diet and exercise       4) See me back in one year. Patient expressed understanding of the plan - questions were answered.      On a side note, she has 2 kids. She is from [place]. She works for a . Went to [employer] 2018.              Ne Pollard MD, [address], [address]  [address] [place] [address].  [address] [place] [address], [address].  Arabi, [address] [place] [address]  Ph: [phone]   Ph [phone]

## 2018-07-17 NOTE — MR AVS SNAPSHOT
[address] [facility] 600 100[address] [facility] 
[phone] Patient: Cole Shepherd MRN: [id number] EGO:[birth year] Visit Information Date & Time Provider Department Dept. Phone Encounter #  
 7/17/2018  8:20 AM Elroy Gerardo MD [facility] [phone] [id number] Your Appointments 7/18/2019  8:40 AM  
ESTABLISHED PATIENT with Elroy Gerardo MD  
[facility] ([address]) Appt Note: annual  
 [address] [facility] 600 100[address] [facility]  
[address] [address] [address] Upcoming Health Maintenance Date Due Influenza Age 5 to Adult 8/1/2018 MEDICARE YEARLY EXAM 12/14/2018 GLAUCOMA SCREENING Q2Y 5/19/2019 BREAST CANCER SCRN MAMMOGRAM 5/15/2020 COLONOSCOPY 6/16/2026 DTaP/Tdap/Td series (2 - Td) 11/14/2026 Allergies as of 7/17/2018  Review Complete On: 7/17/2018 By: Elroy Gerardo MD  
  
 Severity Noted Reaction Type Reactions Erythromycin  02/10/2009    Hives Current Immunizations  Never Reviewed No immunizations on file. Not reviewed this visit You Were Diagnosed With   
  
 Codes Comments Essential hypertension    -  Primary ICD-10-CM: I10 
ICD-9-CM: 401.9 Hyperlipidemia, unspecified hyperlipidemia type     ICD-10-CM: E78.5 ICD-9-CM: 272.4 Vitals Height(growth percentile) Weight(growth percentile) BMI OB Status Smoking Status 5' 4\" (1.626 m) 158 lb (71.7 kg) 27.12 kg/m2 Postmenopausal Never Smoker Vitals History BMI and BSA Data Body Mass Index Body Surface Area  
 27.12 kg/m 2 1.8 m 2 Preferred Pharmacy Pharmacy Name Phone [facility] 1 [address]  [facility] [phone] Your Updated Medication List  
  
   
 This list is accurate as of 7/17/18  8:40 AM.  Always use your most recent med list. amLODIPine 10 mg tablet Commonly known as:  Kenrick Pollard TAKE 1 TABLET BY MOUTH DAILY  
  
 aspirin delayed-release 81 mg tablet Take  by mouth daily. CALCIUM PO Take  by mouth. cholecalciferol 400 unit Tab tablet Commonly known as:  VITAMIN D3 Take  by mouth daily. COLAZAL PO Take 5 tablets by mouth daily. lisinopril-hydroCHLOROthiazide 20-25 mg per tablet Commonly known as:  PRINZIDE, ZESTORETIC  
TAKE 1 TABLET BY MOUTH ONCE DAILY  
  
 meclizine 25 mg tablet Commonly known as:  ANTIVERT  
TAKE 1 TABLET BY MOUTH THREE TIMES DAILY FOR UP TO 10 DAYS AS NEEDED  
  
 predniSONE 5 mg tablet Commonly known as:  Selina Maycol Take  by mouth as needed. Sliding scale with flare ups. rosuvastatin 10 mg tablet Commonly known as:  CRESTOR Take 1 Tab by mouth daily. Prescriptions Sent to Pharmacy Refills  
 rosuvastatin (CRESTOR) 10 mg tablet 3 Sig: Take 1 Tab by mouth daily. Class: Normal  
 Pharmacy: [facility] [address] [place] [address] [address] AT [facility] [address] ([facility] [place] Ph #: [phone] Route: Oral  
  
We Performed the Following AMB POC EKG ROUTINE W/ 12 LEADS, INTER & REP [31975 CPT(R)] Introducing [facility] & HEALTH SERVICES! [facility] introduces [employer] patient portal. Now you can access parts of your medical record, email your doctor's office, and request medication refills online. 1. In your internet browser, go to [email or web address] 2. Click on the First Time User? Click Here link in the Sign In box. You will see the New Member Sign Up page. 3. Enter your [employer] Access Code exactly as it appears below. You will not need to use this code after youve completed the sign-up process. If you do not sign up before the expiration date, you must request a new code. · [employer] Access Code: [email or web address] Expires: 10/15/2018  8:40 AM 
 
4. Enter the last four digits of your Social Security Number (xxxx) and Date of Birth (mm/dd/yyyy) as indicated and click Submit. You will be taken to the next sign-up page. 5. Create a [employer] ID. This will be your [employer] login ID and cannot be changed, so think of one that is secure and easy to remember. 6. Create a [employer] password. You can change your password at any time. 7. Enter your Password Reset Question and Answer. This can be used at a later time if you forget your password. 8. Enter your e-mail address. You will receive e-mail notification when new information is available in [address] 9. Click Sign Up. You can now view and download portions of your medical record. 10. Click the Download Summary menu link to download a portable copy of your medical information. If you have questions, please visit the Frequently Asked Questions section of the [employer] website. Remember, [employer] is NOT to be used for urgent needs. For medical emergencies, dial 911. Now available from your iPhone and Android! Please provide this summary of care documentation to your next provider. Your primary care clinician is listed as Jigar Giron. If you have any questions after today's visit, please call [phone].

## 2018-09-26 ENCOUNTER — OFFICE VISIT (OUTPATIENT)
Dept: INTERNAL MEDICINE CLINIC | Age: 68
End: 2018-09-26

## 2018-09-26 VITALS
SYSTOLIC BLOOD PRESSURE: 129 MMHG | HEART RATE: 76 BPM | DIASTOLIC BLOOD PRESSURE: 81 MMHG | OXYGEN SATURATION: 99 % | BODY MASS INDEX: 26.8 KG/M2 | TEMPERATURE: 98 F | HEIGHT: 64 IN | WEIGHT: 157 LBS | RESPIRATION RATE: 16 BRPM

## 2018-09-26 DIAGNOSIS — M25.522 LEFT ELBOW PAIN: ICD-10-CM

## 2018-09-26 DIAGNOSIS — R07.81 RIB PAIN ON RIGHT SIDE: ICD-10-CM

## 2018-09-26 DIAGNOSIS — I10 ESSENTIAL HYPERTENSION: Primary | ICD-10-CM

## 2018-09-26 RX ORDER — LIDOCAINE 50 MG/G
1 PATCH TOPICAL EVERY 24 HOURS
Qty: 10 EACH | Refills: 0 | Status: SHIPPED | OUTPATIENT
Start: 2018-09-26 | End: 2019-08-20 | Stop reason: ALTCHOICE

## 2018-09-26 NOTE — PROGRESS NOTES
HISTORY OF PRESENT ILLNESS Evan Mittal is a 77 y.o. female. HPI Left Elbow and Rib Pain: Pt fell forward and hit floor on 9/16/18. She bruised left elbow (purplish-yellow bruise), knees, and ribs. She currently notes stiffness in left elbow. She experiences moderate, right-sided rib pain when taking deep breath or twisting body. She endorses stable bowels. She takes Ibuprofen for pain. Hypertension ROS: taking medications as instructed, no medication side effects noted, no TIA's, no chest pain on exertion, no dyspnea on exertion, no swelling of ankles. New concerns:  Patient's BP in office today is 129/81. She continues on Amlodipine and Lisinopril-HCTZ. Pt declines flu shot today in clinic. Review of Systems All other systems reviewed and are negative. Physical Exam  
Constitutional: She is oriented to person, place, and time. She appears well-developed and well-nourished. HENT:  
Head: Normocephalic and atraumatic. Right Ear: External ear normal.  
Left Ear: External ear normal.  
Nose: Nose normal.  
Mouth/Throat: Oropharynx is clear and moist.  
Eyes: Conjunctivae and EOM are normal.  
Neck: Normal range of motion. Neck supple. Carotid bruit is not present. No thyroid mass and no thyromegaly present. Cardiovascular: Normal rate, regular rhythm, S1 normal, S2 normal, normal heart sounds and intact distal pulses. Pulmonary/Chest: Effort normal and breath sounds normal.  
Abdominal: Soft. Normal appearance and bowel sounds are normal. There is no hepatosplenomegaly. There is no tenderness. Musculoskeletal: Normal range of motion. Neurological: She is alert and oriented to person, place, and time. She has normal strength. No cranial nerve deficit or sensory deficit. Coordination normal.  
Skin: Skin is warm, dry and intact. No abrasion and no rash noted. Purplish-yellow bruise on left elbow. Psychiatric: She has a normal mood and affect.  Her behavior is normal. Judgment and thought content normal.  
Nursing note and vitals reviewed. ASSESSMENT and PLAN Diagnoses and all orders for this visit: 1. Essential hypertension BP is at goal. I do not recommend any change in medications. 2. Left elbow pain Stable, improving condition. Prescribed Lidoderm patches. 3. Rib pain on right side Stable, improving condition. Prescribed Lidoderm patches. told her it can take 4-6 weeks heal but let me know if not improving  
-     lidocaine (LIDODERM) 5 %; 1 Patch by TransDERmal route every twenty-four (24) hours. Apply patch to the affected area for 12 hours a day and remove for 12 hours a day. Additional Comments: Pt declined flu shot. Lab results and schedule of future lab studies reviewed with patient. Reviewed diet, exercise and weight control. Written by Mis Gonzalez, as dictated by Ale Loo MD.  
 
Current diagnosis and concerns discussed with pt at length. Understands risks and benefits or current treatment plan and medications and accepts the treatment and medication with any possible risks. Pt asks appropriate questions which were answered. Pt instructed to call with any concerns or problems.

## 2018-11-09 ENCOUNTER — OFFICE VISIT (OUTPATIENT)
Dept: INTERNAL MEDICINE CLINIC | Age: 68
End: 2018-11-09

## 2018-11-09 VITALS
RESPIRATION RATE: 16 BRPM | WEIGHT: 157 LBS | TEMPERATURE: 97.9 F | HEIGHT: 64 IN | DIASTOLIC BLOOD PRESSURE: 63 MMHG | OXYGEN SATURATION: 96 % | BODY MASS INDEX: 26.8 KG/M2 | HEART RATE: 70 BPM | SYSTOLIC BLOOD PRESSURE: 121 MMHG

## 2018-11-09 DIAGNOSIS — E78.5 HYPERLIPIDEMIA, UNSPECIFIED HYPERLIPIDEMIA TYPE: ICD-10-CM

## 2018-11-09 DIAGNOSIS — I10 ESSENTIAL HYPERTENSION: Primary | ICD-10-CM

## 2018-11-09 DIAGNOSIS — M54.2 NECK PAIN: ICD-10-CM

## 2018-11-09 RX ORDER — CYCLOBENZAPRINE HCL 5 MG
5 TABLET ORAL
Qty: 30 TAB | Refills: 1 | Status: SHIPPED | OUTPATIENT
Start: 2018-11-09 | End: 2019-08-20 | Stop reason: ALTCHOICE

## 2018-11-09 NOTE — PROGRESS NOTES
HISTORY OF PRESENT ILLNESS Alex Sanchez is a 77 y.o. female. HPI Neck Pain: Pt started to experience stiffness and limited ROM in neck on 11/04/18. She suspects that symptoms may have resulted from maintaining certain neck position while watching TV. She took Ibuprofen, Advil, and Diclofenac (1 tablet/day for 2 days) and all were ineffective. She applied heat and cold compresses (ineffective). Denies numbness, tingling, hand weakness, SOB, or bowel changes. Hypertension ROS: taking medications as instructed, no medication side effects noted, no TIA's, no chest pain on exertion, no dyspnea on exertion, no swelling of ankles. New concerns:  Patient's BP in office today is 121/63. She continues on Amlodipine and Lisinopril-HCTZ. Hyperlipidemia: 
Cardiovascular risk analysis - 77 y.o. female LDL goal is under 130. ROS: taking medications as instructed, no medication side effects noted, no TIA's, no chest pain on exertion, no dyspnea on exertion, no swelling of ankles. Tolerating meds, no myalgias or other side effects noted New concerns: Her last LDL was 80 on 6/13/18. Pt continues on Crestor. Review of Systems All other systems reviewed and are negative. Physical Exam  
Constitutional: She is oriented to person, place, and time. She appears well-developed and well-nourished. HENT:  
Head: Normocephalic and atraumatic. Right Ear: External ear normal.  
Left Ear: External ear normal.  
Nose: Nose normal.  
Mouth/Throat: Oropharynx is clear and moist.  
Eyes: Conjunctivae and EOM are normal.  
Neck: Normal range of motion. Neck supple. Carotid bruit is not present. No thyroid mass and no thyromegaly present. Cardiovascular: Normal rate, regular rhythm, S1 normal, S2 normal, normal heart sounds and intact distal pulses. Pulmonary/Chest: Effort normal and breath sounds normal.  
Abdominal: Soft.  Normal appearance and bowel sounds are normal. There is no hepatosplenomegaly. There is no tenderness. Musculoskeletal: Normal range of motion. Neurological: She is alert and oriented to person, place, and time. She has normal strength. No cranial nerve deficit or sensory deficit. Coordination normal.  
Skin: Skin is warm, dry and intact. No abrasion and no rash noted. Psychiatric: She has a normal mood and affect. Her behavior is normal. Judgment and thought content normal.  
Nursing note and vitals reviewed. ASSESSMENT and PLAN Diagnoses and all orders for this visit: 1. Essential hypertension BP is at goal. I do not recommend any change in medications. 2. Neck pain Prescribed Flexeril. Advised pt to also take Diclofenac and perform stretching exercises. Discussed that, if meds are ineffective, pt should contact me to take Prednisone (60mg/day for 5 days). -     cyclobenzaprine (FLEXERIL) 5 mg tablet; Take 1 Tab by mouth three (3) times daily (with meals). 3. Hyperlipidemia, unspecified hyperlipidemia type Stable, patient tolerating meds, no myalgias. I do not recommend any change in medications. 4. BMI 26.0-26.9,adult Stable condition. Encouraged pt to exercise regularly and monitor diet. Lab results and schedule of future lab studies reviewed with patient. Reviewed diet, exercise and weight control. Written by Sherine Caballero, as dictated by Robyn Harper MD.  
 
Current diagnosis and concerns discussed with pt at length. Understands risks and benefits or current treatment plan and medications and accepts the treatment and medication with any possible risks. Pt asks appropriate questions which were answered. Pt instructed to call with any concerns or problems.

## 2018-11-12 ENCOUNTER — TELEPHONE (OUTPATIENT)
Dept: INTERNAL MEDICINE CLINIC | Age: 68
End: 2018-11-12

## 2018-11-12 RX ORDER — PREDNISONE 20 MG/1
TABLET ORAL
Qty: 12 TAB | Refills: 0 | Status: SHIPPED | OUTPATIENT
Start: 2018-11-12 | End: 2018-12-17 | Stop reason: SDUPTHER

## 2018-11-12 NOTE — TELEPHONE ENCOUNTER
Pt had Prednisone at her home from GI provides she started the 60mg yesterday, Sunday, and has enough for a couple of days but needs more to continue for the 5 days and taper down.  Please send to local.

## 2018-12-18 RX ORDER — PREDNISONE 20 MG/1
TABLET ORAL
Qty: 12 TAB | Refills: 0 | Status: SHIPPED | OUTPATIENT
Start: 2018-12-18 | End: 2019-08-20 | Stop reason: SDUPTHER

## 2019-01-07 DIAGNOSIS — E78.5 HYPERLIPIDEMIA, UNSPECIFIED HYPERLIPIDEMIA TYPE: ICD-10-CM

## 2019-01-07 RX ORDER — AMLODIPINE BESYLATE 10 MG/1
TABLET ORAL
Qty: 90 TAB | Refills: 0 | Status: SHIPPED | OUTPATIENT
Start: 2019-01-07 | End: 2019-03-13 | Stop reason: SDUPTHER

## 2019-03-11 DIAGNOSIS — E78.5 HYPERLIPIDEMIA, UNSPECIFIED HYPERLIPIDEMIA TYPE: ICD-10-CM

## 2019-03-12 RX ORDER — LISINOPRIL AND HYDROCHLOROTHIAZIDE 20; 25 MG/1; MG/1
TABLET ORAL
Qty: 90 TAB | Refills: 0 | Status: SHIPPED | OUTPATIENT
Start: 2019-03-12 | End: 2019-06-06 | Stop reason: SDUPTHER

## 2019-03-13 DIAGNOSIS — E78.5 HYPERLIPIDEMIA, UNSPECIFIED HYPERLIPIDEMIA TYPE: ICD-10-CM

## 2019-03-13 RX ORDER — AMLODIPINE BESYLATE 10 MG/1
10 TABLET ORAL DAILY
Qty: 90 TAB | Refills: 0 | Status: SHIPPED | OUTPATIENT
Start: 2019-03-13 | End: 2019-09-04 | Stop reason: SDUPTHER

## 2019-03-27 DIAGNOSIS — R42 VERTIGO: ICD-10-CM

## 2019-03-27 RX ORDER — MECLIZINE HYDROCHLORIDE 25 MG/1
TABLET ORAL
Qty: 30 TAB | Refills: 0 | Status: SHIPPED | OUTPATIENT
Start: 2019-03-27 | End: 2019-08-20 | Stop reason: SDUPTHER

## 2019-04-04 NOTE — PROGRESS NOTES
04/04/19 1658   Encounter Summary   Services provided to: Patient   Referral/Consult From: Palliative Care;Rounding   Continue Visiting   (4/4, dje   sleep )   Complexity of Encounter Low   Length of Encounter 15 minutes   Routine   Type Follow up   Assessment Sleeping HISTORY OF PRESENT ILLNESS  Robin Benoit is a 69 y.o. female. HPI   Patient reports vertigo is intermittent. She notes the duration of flares fluctuates from a few hours to a few days. Patient reports onset of blood and mucus in stool 1 month ago. She notes colitis flare and she followed up with GI. Patient was put on Prednisone. She was not feeling better after a week, but could not follow up with GI. GI increased Colazol. Patient states she will follow up for colonoscopy in August. Patient notes flare has cleared and she is fine today in office. Hypertension ROS: taking medications as instructed, no medication side effects noted, no TIA's, no chest pain on exertion, no dyspnea on exertion, no swelling of ankles. New concerns:  Patient's BP in office today is 128/72. Hyperlipidemia:  Cardiovascular risk analysis - 69 y.o. female LDL goal is under 130. ROS: taking medications as instructed, no medication side effects noted, no TIA's, no chest pain on exertion, no dyspnea on exertion, no swelling of ankles. Tolerating meds, no myalgias or other side effects noted  New concerns: Last LDL was 80 on 12/13/17. Patient travelling to [place] with family. Review of Systems   All other systems reviewed and are negative. Physical Exam   Constitutional: She is oriented to person, place, and time. She appears well-developed and well-nourished. HENT:   Head: Normocephalic and atraumatic. Right Ear: External ear normal.   Left Ear: External ear normal.   Nose: Nose normal.   Mouth/Throat: Oropharynx is clear and moist.   Eyes: Conjunctivae and EOM are normal.   Neck: Normal range of motion. Neck supple. Carotid bruit is not present. No thyroid mass and no thyromegaly present. Cardiovascular: Normal rate, regular rhythm, S1 normal, S2 normal, normal heart sounds and intact distal pulses. Pulmonary/Chest: Effort normal and breath sounds normal.   Abdominal: Soft.  Normal appearance and bowel sounds are normal. There is no hepatosplenomegaly. There is no tenderness. Musculoskeletal: Normal range of motion. Neurological: She is alert and oriented to person, place, and time. She has normal strength. No cranial nerve deficit or sensory deficit. Coordination normal.   Skin: Skin is warm, dry and intact. No abrasion and no rash noted. Psychiatric: She has a normal mood and affect. Her behavior is normal. Judgment and thought content normal.   Nursing note and vitals reviewed. ASSESSMENT and PLAN  Diagnoses and all orders for this visit:    1. Other ulcerative colitis without complication (HCC)  Last flare cleared with prednisone and increase in Colazol. No acute flare today in office. Patient will continue to monitor and follow up for colonoscopy in August.   -     CBC WITH AUTOMATED DIFF    2. Hyperlipidemia, unspecified hyperlipidemia type  Stable, patient tolerating meds, no myalgias. I do not recommend any change in medications. -     amLODIPine (NORVASC) 10 mg tablet; TAKE 1 TABLET BY MOUTH DAILY  -     lisinopril-hydroCHLOROthiazide (PRINZIDE, ZESTORETIC) 20-25 mg per tablet; TAKE 1 TABLET BY MOUTH ONCE DAILY  -     LIPID PANEL  -     METABOLIC PANEL, COMPREHENSIVE    3. Essential hypertension  BP is at goal. I do not recommend any change in medications.  -     METABOLIC PANEL, COMPREHENSIVE    lab results and schedule of future lab studies reviewed with patient  reviewed diet, exercise and weight control    Written by Megan Mane, as dictated by Zahra Preston MD.     Current diagnosis and concerns discussed with pt at length. Understands risks and benefits or current treatment plan and medications and accepts the treatment and medication with any possible risks. Pt asks appropriate questions which were answered. Pt instructed to call with any concerns or problems.

## 2019-06-06 DIAGNOSIS — E78.5 HYPERLIPIDEMIA, UNSPECIFIED HYPERLIPIDEMIA TYPE: ICD-10-CM

## 2019-06-07 RX ORDER — LISINOPRIL AND HYDROCHLOROTHIAZIDE 20; 25 MG/1; MG/1
TABLET ORAL
Qty: 90 TAB | Refills: 0 | Status: SHIPPED | OUTPATIENT
Start: 2019-06-07 | End: 2019-09-03 | Stop reason: SDUPTHER

## 2019-07-10 RX ORDER — ROSUVASTATIN CALCIUM 10 MG/1
TABLET, COATED ORAL
Qty: 90 TAB | Refills: 0 | Status: SHIPPED | OUTPATIENT
Start: 2019-07-10 | End: 2019-08-20 | Stop reason: SDUPTHER

## 2019-08-20 ENCOUNTER — OFFICE VISIT (OUTPATIENT)
Dept: CARDIOLOGY CLINIC | Age: 69
End: 2019-08-20

## 2019-08-20 VITALS
BODY MASS INDEX: 27.83 KG/M2 | DIASTOLIC BLOOD PRESSURE: 60 MMHG | HEART RATE: 78 BPM | SYSTOLIC BLOOD PRESSURE: 108 MMHG | WEIGHT: 163 LBS | HEIGHT: 64 IN | OXYGEN SATURATION: 96 %

## 2019-08-20 DIAGNOSIS — Z00.00 ANNUAL PHYSICAL EXAM: ICD-10-CM

## 2019-08-20 DIAGNOSIS — I10 ESSENTIAL HYPERTENSION: ICD-10-CM

## 2019-08-20 DIAGNOSIS — E78.5 HYPERLIPIDEMIA, UNSPECIFIED HYPERLIPIDEMIA TYPE: Primary | ICD-10-CM

## 2019-08-20 RX ORDER — ROSUVASTATIN CALCIUM 10 MG/1
10 TABLET, COATED ORAL
Qty: 90 TAB | Refills: 3 | Status: SHIPPED | OUTPATIENT
Start: 2019-08-20 | End: 2020-07-07 | Stop reason: SDUPTHER

## 2019-08-20 NOTE — PROGRESS NOTES
Lorraine Sanford MD. [facility]              Patient: Don Arias  : 1950      Today's Date: 2019            HISTORY OF PRESENT ILLNESS:     History of Present Illness:  Here for FU. No cardiac complaints. No CP or SOB. No dizziness. Remains active. Colitis is biggest issue. PAST MEDICAL HISTORY:     Past Medical History:   Diagnosis Date    [place] coronary artery calcium score less than 100     CT Heart scan  - CAC score 4    Cancer ([address] Left    breast, s/p lumpectomy    Dyslipidemia     Labs 14 - chol 284, , HDL 73, , Apo B 146, LDL-P 2626, CRP 10    HTN (hypertension)     Malignant melanoma nos Left    arm    Mild carotid artery disease (HCC)     Nausea & vomiting     Nausea only    Ulcerative colitis        Past Surgical History:   Procedure Laterality Date    COLONOSCOPY N/A 2016    COLONOSCOPY performed by Denisse Schaffer MD at [address] [place] [address], COLON, DIAGNOSTIC       polyp    HX BREAST LUMPECTOMY Left     lumpectomy     HX  SECTION             MEDICATIONS:     Current Outpatient Medications   Medication Sig Dispense Refill    rosuvastatin (CRESTOR) 10 mg tablet TAKE 1 TABLET BY MOUTH DAILY 90 Tab 0    lisinopril-hydroCHLOROthiazide (PRINZIDE, ZESTORETIC) 20-25 mg per tablet TAKE 1 TABLET BY MOUTH EVERY DAY 90 Tab 0    amLODIPine (NORVASC) 10 mg tablet Take 1 Tab by mouth daily. 90 Tab 0    meclizine (ANTIVERT) 25 mg tablet TAKE 1 TABLET BY MOUTH THREE TIMES DAILY FOR UP TO 10 DAYS AS NEEDED 30 Tab 3    cholecalciferol (VITAMIN D3) 400 unit tab tablet Take 600 Units by mouth daily.  aspirin delayed-release 81 mg tablet Take  by mouth daily.  predniSONE (DELTASONE) 5 mg tablet Take  by mouth as needed. Sliding scale with flare ups.  CALCIUM PO Take 1,000 mg by mouth.  COLAZAL PO Take 5 tablets by mouth daily.          Allergies   Allergen Reactions    Erythromycin Hives SOCIAL HISTORY:     Social History     Tobacco Use    Smoking status: Never Smoker    Smokeless tobacco: Never Used   Substance Use Topics    Alcohol use: No     Alcohol/week: 0.0 standard drinks    Drug use: No         FAMILY HISTORY:     Family History   Problem Relation Age of Onset    Cancer Mother         lung    Asthma Father         emphysema    Cancer Brother         melanoma    Cancer Brother         thyroid            REVIEW OF SYSTEMS:       Review of Systems:    Constitutional: Negative for fever, chills    HEENT: Negative for vision changes.    Respiratory: Negative for cough    Cardiovascular: Negative for orthopnea, syncope, and PND.    Gastrointestinal: Negative for abdominal pain, diarrhea, or melena    Genitourinary: Negative for dysuria    Musculoskeletal: Negative for myalgias.    Skin: Negative for rash    Heme: No problems bleeding.    Neurological: Negative for speech change and focal weakness.                    PHYSICAL EXAM:      Physical Exam:  Visit Vitals  /60 (BP 1 Location: Right arm, BP Patient Position: Sitting)   Pulse 78   Ht 5' 4\" (1.626 m)   Wt 163 lb (73.9 kg)   SpO2 96%   BMI 27.98 kg/m²       Patient appears generally well, mood and affect are appropriate and pleasant. HEENT:  Hearing intact, non-icteric, normocephalic, atraumatic. Neck Exam: Supple, No JVD   Lung Exam: Clear to auscultation, even breath sounds. Cardiac Exam: Regular rate and rhythm with 2/6 systolic murmur (radiating to carotids)  Abdomen: Soft, non-tender, normal bowel sounds. No bruits or masses. Extremities: Moves all ext well. No lower extremity edema. Vascular: 2+ dorsalis pedis pulses bilaterally.   Psych: Appropriate affect  Neuro - Grossly intact                  LABS / OTHER STUDIES:       Labs 11/21/14 - chol 284, , HDL 73, , Apo B 146, LDL-P 2626, CRP 10, proBNP 70, ASA works 5851, Cr 0.8, A1c 5, K 4.5, LFT's OK     Lab Results   Component Value Date/Time    Sodium 140 06/13/2018 08:40 AM    Potassium 4.7 06/13/2018 08:40 AM    Chloride 98 06/13/2018 08:40 AM    CO2 23 06/13/2018 08:40 AM    Anion gap 18 04/12/2016 08:00 AM    Glucose 73 06/13/2018 08:40 AM    BUN 22 06/13/2018 08:40 AM    Creatinine 0.79 06/13/2018 08:40 AM    BUN/Creatinine ratio 28 06/13/2018 08:40 AM    GFR est AA 88 06/13/2018 08:40 AM    GFR est non-AA 77 06/13/2018 08:40 AM    Calcium 9.7 06/13/2018 08:40 AM    Bilirubin, total 0.3 06/13/2018 08:40 AM    AST (SGOT) 24 06/13/2018 08:40 AM    Alk. phosphatase 60 06/13/2018 08:40 AM    Protein, total 6.9 06/13/2018 08:40 AM    Albumin 4.4 06/13/2018 08:40 AM    Globulin 3.3 08/13/2010 06:54 PM    A-G Ratio 1.8 06/13/2018 08:40 AM    ALT (SGPT) 15 06/13/2018 08:40 AM     Lab Results   Component Value Date/Time    WBC 9.2 06/13/2018 08:40 AM    HGB 14.5 06/13/2018 08:40 AM    HCT 43.6 06/13/2018 08:40 AM    PLATELET 730 (H) 45/72/4132 08:40 AM    MCV 84 06/13/2018 08:40 AM       Lab Results   Component Value Date/Time    Cholesterol, total 193 06/13/2018 08:40 AM    HDL Cholesterol 81 06/13/2018 08:40 AM    LDL, calculated 80 06/13/2018 08:40 AM    VLDL, calculated 32 06/13/2018 08:40 AM    Triglyceride 159 (H) 06/13/2018 08:40 AM    CHOL/HDL Ratio 4.3 08/13/2010 06:54 PM                 CARDIAC DIAGNOSTICS:       Cardiac Evaluation Includes:    CT Heart Scan 7/11 - CAC score 6    CT Heart scan 8/14 - CAC score 4  Carotid Dopplers 11/5/14 - 0-9% JEREMIAH and 03-57% LICA stenosis    Stress Echo 11/5/14 - walked 5 min. Normal stress EKG and stress Echo. Additional echo findings showed normal RV size and function, normal AV, MV, and TV. Trace MR and TR. PA pressure was 40 mmHg at rest and 45 mmHg after peak exercise. Echo 12/18/16 - LVEF 55-60%, AV thickening.   Atrial septal aneurysm.    Treadmill Stress Test 1/5/17 - walked 4:04 (7.0 METS), normal study   Carotid Dopplers 7/6/17 - 0-9% JEREMIAH and 89-33% LICA stenosis               EKG 9/14 (reviewed by me) - NSR, Non-specific ST changes    EKG 4/28/16 - NSR, normal   EKG 7/17/18 - NSR, non- non-specific T wave abn   EKG 8/20/19 - NSR, non-specific             ASSESSMENT AND PLAN:       Assessment and Plan:    1) History of atypical chest pain    - Very low CAC score (4) in 2014  - Stress test 1/17 was normal.   - No complaints of chest pain recently       2) HTN  - BP looks good - continue meds       3) Dyslipidemia -    - Labs 11/21/14 - chol 284, , HDL 73, , Apo B 146, LDL-P 2626, CRP 10  - Ms. F did initially not want to take a statin despite high lipids for years. Fortunately her CAC score has been low. Her [place] risk score gave her 8.2% 10 year CV risk.    - After much discussion, she agreed to take a statin and is tolerating it.     - Continue Crestor at 10 mg daily -- on a statin her lipids look great  - Will recheck lipids   - Based on recent literature, ASA is not necessary for her   - encouraged healthy diet and exercise       4) See me back in one year.  Patient expressed understanding of the plan - questions were answered.      On a side note, she has 2 kids. Kayy Tabares is from [address] [place] [address],[address].  She works for a . Went to [place] 2018. Did [place] baseball tour summer 2019.               Stephani Jackman MD, [facility]  [address], [address]      [address]  [address], [address] [place] [address]  Ph: [phone]                               Ph [phone]

## 2019-08-20 NOTE — PROGRESS NOTES
Chief Complaint   Patient presents with    Hypertension    Cholesterol Problem    Annual Exam     Visit Vitals  /60 (BP 1 Location: Right arm, BP Patient Position: Sitting)   Pulse 78   Ht 5' 4\" (1.626 m)   Wt 163 lb (73.9 kg)   SpO2 96%   BMI 27.98 kg/m²     Chest pain denied  SOB denied  Dizziness vertigo occasionally; takes meclizine for it  Swelling denied  Falls in past year 3-4 months ago; tripped  Recent hospital visit denied  Refills rosuvastatin

## 2019-08-27 ENCOUNTER — OFFICE VISIT (OUTPATIENT)
Dept: INTERNAL MEDICINE CLINIC | Age: 69
End: 2019-08-27

## 2019-08-27 ENCOUNTER — HOSPITAL ENCOUNTER (OUTPATIENT)
Dept: LAB | Age: 69
Discharge: HOME | End: 2019-08-27
Payer: MEDICARE

## 2019-08-27 VITALS
SYSTOLIC BLOOD PRESSURE: 131 MMHG | TEMPERATURE: 97.9 F | HEIGHT: 64 IN | OXYGEN SATURATION: 96 % | WEIGHT: 162 LBS | HEART RATE: 88 BPM | BODY MASS INDEX: 27.66 KG/M2 | DIASTOLIC BLOOD PRESSURE: 76 MMHG | RESPIRATION RATE: 17 BRPM

## 2019-08-27 DIAGNOSIS — I10 ESSENTIAL HYPERTENSION: ICD-10-CM

## 2019-08-27 DIAGNOSIS — E78.5 HYPERLIPIDEMIA, UNSPECIFIED HYPERLIPIDEMIA TYPE: ICD-10-CM

## 2019-08-27 DIAGNOSIS — R50.9 FEVER AND CHILLS: ICD-10-CM

## 2019-08-27 DIAGNOSIS — K51.211 ULCERATIVE PROCTITIS WITH RECTAL BLEEDING (HCC): ICD-10-CM

## 2019-08-27 DIAGNOSIS — Z00.00 MEDICARE ANNUAL WELLNESS VISIT, SUBSEQUENT: Primary | ICD-10-CM

## 2019-08-27 LAB
ALBUMIN SERPL-MCNC: 4.7 G/DL (ref 3.5–4.8)
ALBUMIN/GLOB SERPL: 1.7 {RATIO} (ref 1.2–2.2)
ALP SERPL-CCNC: 65 IU/L (ref 39–117)
ALT SERPL-CCNC: 13 IU/L (ref 0–32)
AST SERPL-CCNC: 19 IU/L (ref 0–40)
BILIRUB SERPL-MCNC: 0.4 MG/DL (ref 0–1.2)
BUN SERPL-MCNC: 16 MG/DL (ref 8–27)
BUN/CREAT SERPL: 16 (ref 12–28)
CALCIUM SERPL-MCNC: 10.2 MG/DL (ref 8.7–10.3)
CHLORIDE SERPL-SCNC: 97 MMOL/L (ref 96–106)
CHOLEST SERPL-MCNC: 184 MG/DL (ref 100–199)
CO2 SERPL-SCNC: 24 MMOL/L (ref 20–29)
CREAT SERPL-MCNC: 0.97 MG/DL (ref 0.57–1)
GLOBULIN SER CALC-MCNC: 2.8 G/DL (ref 1.5–4.5)
GLUCOSE SERPL-MCNC: 90 MG/DL (ref 65–99)
HDL SERPL-SCNC: 41.7 UMOL/L
HDLC SERPL-MCNC: 67 MG/DL
INTERPRETATION, 910389: NORMAL
INTERPRETATION: NORMAL
LDL SERPL QN: 20.6 NM
LDL SERPL-SCNC: 1059 NMOL/L
LDL SMALL SERPL-SCNC: 582 NMOL/L
LDLC SERPL CALC-MCNC: 87 MG/DL (ref 0–99)
LP-IR SCORE SERPL: 50
PDF IMAGE, 910387: NORMAL
POTASSIUM SERPL-SCNC: 4.2 MMOL/L (ref 3.5–5.2)
PROT SERPL-MCNC: 7.5 G/DL (ref 6–8.5)
SODIUM SERPL-SCNC: 142 MMOL/L (ref 134–144)
TRIGL SERPL-MCNC: 148 MG/DL (ref 0–149)

## 2019-08-27 PROCEDURE — 81003 URINALYSIS AUTO W/O SCOPE: CPT

## 2019-08-27 PROCEDURE — 85025 COMPLETE CBC W/AUTO DIFF WBC: CPT

## 2019-08-27 PROCEDURE — 36415 COLL VENOUS BLD VENIPUNCTURE: CPT

## 2019-08-27 PROCEDURE — 87086 URINE CULTURE/COLONY COUNT: CPT

## 2019-08-27 RX ORDER — AMOXICILLIN AND CLAVULANATE POTASSIUM 875; 125 MG/1; MG/1
1 TABLET, FILM COATED ORAL EVERY 12 HOURS
Qty: 20 TAB | Refills: 0 | Status: SHIPPED | OUTPATIENT
Start: 2019-08-27 | End: 2020-07-07 | Stop reason: ALTCHOICE

## 2019-08-27 NOTE — PROGRESS NOTES
HISTORY OF PRESENT ILLNESS  Demarcus Allen is a 68 y.o. female. HPI  Ulcerative colitis: Pt reports that she had a flare up of colitis (bleeding, severe pain) and was given an rx for Prednisone. She notes vast improvement since finishing Prednisone. Pt would like a referral for another GI specialist due to not being able to get into office soon. F/c: Pt reports that she has been experiencing fever (.5 degree), HA, frequent BM's, abdominal pain and night sweats for the last few weeks. Denies cough, sinus congestion or drainage. Pt requests Prednisone. Hypertension ROS: taking medications as instructed, no medication side effects noted, no TIA's, no chest pain on exertion, no dyspnea on exertion, no swelling of ankles. New concerns: BP in office today is 131/76. Hyperlipidemia:  Cardiovascular risk analysis - 68 y.o. female LDL goal is under 130. ROS: taking medications as instructed, no medication side effects noted, no TIA's, no chest pain on exertion, no dyspnea on exertion, no swelling of ankles. Tolerating meds, no myalgias or other side effects noted  New concerns: Pt's last LDL was 80 on 6/14/18. Review of Systems   Constitutional: Positive for chills and fever. Night sweats   HENT: Negative for congestion and sore throat. Respiratory: Negative for cough. Gastrointestinal:        Increased BM frequency   Neurological: Positive for headaches. All other systems reviewed and are negative. Physical Exam   Constitutional: She is oriented to person, place, and time. She appears well-developed and well-nourished. HENT:   Head: Normocephalic and atraumatic. Right Ear: External ear normal.   Left Ear: External ear normal.   Nose: Nose normal.   Mouth/Throat: Oropharynx is clear and moist.   Eyes: Pupils are equal, round, and reactive to light. Conjunctivae and EOM are normal.   Neck: Normal range of motion. Neck supple.    Cardiovascular: Normal rate, regular rhythm, normal heart sounds and intact distal pulses. Pulmonary/Chest: Effort normal and breath sounds normal. Right breast exhibits no inverted nipple, no mass, no nipple discharge, no skin change and no tenderness. Left breast exhibits no inverted nipple, no mass, no nipple discharge, no skin change and no tenderness. No breast swelling, tenderness, discharge or bleeding. Breasts are symmetrical.   Abdominal: Soft. Bowel sounds are normal. There is tenderness. Decreased bowel sounds   Genitourinary: Rectum normal and vagina normal. Rectal exam shows anal tone normal and guaiac negative stool. No breast swelling, tenderness, discharge or bleeding. Musculoskeletal: Normal range of motion. Neurological: She is alert and oriented to person, place, and time. Skin: Skin is warm and dry. Psychiatric: She has a normal mood and affect. Her behavior is normal. Judgment and thought content normal.   Nursing note and vitals reviewed. ASSESSMENT and PLAN  Diagnoses and all orders for this visit:    1. Medicare annual wellness visit, subsequent    2. Essential hypertension  BP is at goal with Lisinopril-HCTZ and Amlodipine. I do not recommend any change in medications. 3. Hyperlipidemia, unspecified hyperlipidemia type  Stable with Crestor, patient is tolerating medications, no myalgias. I do not recommend any change in medications. 4. Fever and chills  Ordered abdominal CT. Discussed with pt that she could be experiencing an abscess. Will continue to monitor for improvements or changes. Waiting on scans.  -     URINALYSIS W/ RFLX MICROSCOPIC  -     CULTURE, URINE  -     CT ABD PELV W CONT; Future    5. Ulcerative proctitis with rectal bleeding (HCC)  Prescribed Augmentin and ordered abdominal CT. Discussed with pt that she could be experiencing an abscess. Will continue to monitor for improvements or changes. Waiting on scans.  Advised pt to start Augmentin preemptively for an abscess.   -     REFERRAL TO GASTROENTEROLOGY  -     CBC WITH AUTOMATED DIFF  -     CT ABD PELV W CONT; Future  -     amoxicillin-clavulanate (AUGMENTIN) 875-125 mg per tablet; Take 1 Tab by mouth every twelve (12) hours. Lab results and schedule of future lab studies reviewed with patient. Reviewed diet, exercise and weight control. Written by Vandana Rosenberg, as dictated by Jermaine Díaz MD.     Current diagnosis and concerns discussed with pt at length. Understands risks and benefits or current treatment plan and medications and accepts the treatment and medication with any possible risks. Pt asks appropriate questions which were answered. Pt instructed to call with any concerns or problems. This note will not be viewable in [address]

## 2019-08-27 NOTE — PATIENT INSTRUCTIONS
Medicare Wellness Visit, Female     The best way to live healthy is to have a lifestyle where you eat a well-balanced diet, exercise regularly, limit alcohol use, and quit all forms of tobacco/nicotine, if applicable. Regular preventive services are another way to keep healthy. Preventive services (vaccines, screening tests, monitoring & exams) can help personalize your care plan, which helps you manage your own care. Screening tests can find health problems at the earliest stages, when they are easiest to treat. [facility] follows the current, evidence-based guidelines published by the [facility] Dhruv Nicholas ([facility]) when recommending preventive services for our patients. Because we follow these guidelines, sometimes recommendations change over time as research supports it. (For example, mammograms used to be recommended annually. Even though Medicare will still pay for an annual mammogram, the newer guidelines recommend a mammogram every two years for women of average risk.)  Of course, you and your doctor may decide to screen more often for some diseases, based on your risk and your health status. Preventive services for you include:  - Medicare offers their members a free annual wellness visit, which is time for you and your primary care provider to discuss and plan for your preventive service needs. Take advantage of this benefit every year!  -All adults over the age of 72 should receive the recommended pneumonia vaccines. Current USPSTF guidelines recommend a series of two vaccines for the best pneumonia protection.   -All adults should have a flu vaccine yearly and a tetanus vaccine every 10 years. All adults age 61 and older should receive a shingles vaccine once in their lifetime.    -A bone mass density test is recommended when a woman turns 65 to screen for osteoporosis. This test is only recommended one time, as a screening.  Some providers will use this same test as a disease monitoring tool if you already have osteoporosis. -All adults age 38-68 who are overweight should have a diabetes screening test once every three years.   -Other screening tests and preventive services for persons with diabetes include: an eye exam to screen for diabetic retinopathy, a kidney function test, a foot exam, and stricter control over your cholesterol.   -Cardiovascular screening for adults with routine risk involves an electrocardiogram (ECG) at intervals determined by your doctor.   -Colorectal cancer screenings should be done for adults age 54-65 with no increased risk factors for colorectal cancer. There are a number of acceptable methods of screening for this type of cancer. Each test has its own benefits and drawbacks. Discuss with your doctor what is most appropriate for you during your annual wellness visit. The different tests include: colonoscopy (considered the best screening method), a fecal occult blood test, a fecal DNA test, and sigmoidoscopy. -Breast cancer screenings are recommended every other year for women of normal risk, age 54-69.  -Cervical cancer screenings for women over age 72 are only recommended with certain risk factors.   -All adults born between [facility] should be screened once for Hepatitis C.      Here is a list of your current Health Maintenance items (your personalized list of preventive services) with a due date:  Health Maintenance Due   Topic Date Due    Pneumococcal Vaccine (1 of 2 - PCV13) 07/03/2013    Annual Well Visit  12/14/2018    Glaucoma Screening   05/19/2019

## 2019-08-27 NOTE — PROGRESS NOTES
This is the Subsequent Medicare Annual Wellness Exam, performed 12 months or more after the Initial AWV or the last Subsequent AWV    I have reviewed the patient's medical history in detail and updated the computerized patient record. History     Past Medical History:   Diagnosis Date    Agatston coronary artery calcium score less than 100     CT Heart scan  - CAC score 4    Cancer ([address] Left    breast, s/p lumpectomy    Dyslipidemia     Labs 14 - chol 284, , HDL 73, , Apo B 146, LDL-P 2626, CRP 10    HTN (hypertension)     Malignant melanoma nos Left    arm    Mild carotid artery disease (HCC)     Nausea & vomiting     Nausea only    Ulcerative colitis       Past Surgical History:   Procedure Laterality Date    COLONOSCOPY N/A 2016    COLONOSCOPY performed by Joanna Liu MD at [facility]. [address], COLON, DIAGNOSTIC       polyp    HX BREAST LUMPECTOMY Left     lumpectomy     HX  SECTION       Current Outpatient Medications   Medication Sig Dispense Refill    rosuvastatin (CRESTOR) 10 mg tablet Take 1 Tab by mouth nightly. 90 Tab 3    lisinopril-hydroCHLOROthiazide (PRINZIDE, ZESTORETIC) 20-25 mg per tablet TAKE 1 TABLET BY MOUTH EVERY DAY 90 Tab 0    amLODIPine (NORVASC) 10 mg tablet Take 1 Tab by mouth daily. 90 Tab 0    meclizine (ANTIVERT) 25 mg tablet TAKE 1 TABLET BY MOUTH THREE TIMES DAILY FOR UP TO 10 DAYS AS NEEDED 30 Tab 3    cholecalciferol (VITAMIN D3) 400 unit tab tablet Take 600 Units by mouth daily.  predniSONE (DELTASONE) 5 mg tablet Take  by mouth as needed. Sliding scale with flare ups.  CALCIUM PO Take 1,000 mg by mouth.  COLAZAL PO Take 5 tablets by mouth daily.        Allergies   Allergen Reactions    Erythromycin Hives     Family History   Problem Relation Age of Onset    Cancer Mother         lung    Asthma Father         emphysema    Cancer Brother         melanoma    Cancer Brother         thyroid Social History     Tobacco Use    Smoking status: Never Smoker    Smokeless tobacco: Never Used   Substance Use Topics    Alcohol use: No     Alcohol/week: 0.0 standard drinks     Patient Active Problem List   Diagnosis Code    Breast cancer ([facility] C50.919    Hyperlipidemia E78.5    Ulcerative colitis ([facility] K51.90    Mild carotid artery disease ([facility] I77.9    Essential hypertension I10    ACP (advance care planning) Z71.89       Depression Risk Factor Screening:     3 most recent PHQ Screens 8/27/2019   Little interest or pleasure in doing things Not at all   Feeling down, depressed, irritable, or hopeless Not at all   Total Score PHQ 2 0     Alcohol Risk Factor Screening: You do not drink alcohol or very rarely. Functional Ability and Level of Safety:   Hearing Loss  Hearing is good. Activities of Daily Living  The home contains: no safety equipment. Patient does total self care    Fall Risk  Fall Risk Assessment, last 12 mths 8/27/2019   Able to walk? Yes   Fall in past 12 months? Yes   Fall with injury? No   Number of falls in past 12 months 1   Fall Risk Score 1       Abuse Screen  Patient is not abused    Cognitive Screening   Evaluation of Cognitive Function:  Has your family/caregiver stated any concerns about your memory: no  Normal    Patient Care Team   Patient Care Team:  Christa Sepulveda MD as PCP - Elon Mix, Sable Severe., MD (Gastroenterology)  Rocio Everett MD as Consulting Provider (Cardiology)  Rocio Everett MD (Cardiology)    Assessment/Plan   Education and counseling provided:  Are appropriate based on today's review and evaluation  End-of-Life planning (with patient's consent)    Diagnoses and all orders for this visit:    1. Other ulcerative colitis without complication ([facility]    2. Essential hypertension    3.  Hyperlipidemia, unspecified hyperlipidemia type        Health Maintenance Due   Topic Date Due    Pneumococcal 65+ years (1 of 2 - PCV13) 07/03/2013    MEDICARE YEARLY EXAM  12/14/2018    GLAUCOMA SCREENING Q2Y  05/19/2019     This note will not be viewable in [facility].

## 2019-08-28 ENCOUNTER — HOSPITAL ENCOUNTER (OUTPATIENT)
Dept: CT IMAGING | Age: 69
Discharge: HOME | End: 2019-08-28
Attending: INTERNAL MEDICINE
Payer: MEDICARE

## 2019-08-28 DIAGNOSIS — K51.211 ULCERATIVE PROCTITIS WITH RECTAL BLEEDING (HCC): ICD-10-CM

## 2019-08-28 DIAGNOSIS — N28.89 RENAL MASS: Primary | ICD-10-CM

## 2019-08-28 DIAGNOSIS — R50.9 FEVER AND CHILLS: ICD-10-CM

## 2019-08-28 PROCEDURE — 74011636320 HC RX REV CODE- 636/320: Performed by: INTERNAL MEDICINE

## 2019-08-28 PROCEDURE — 74177 CT ABD & PELVIS W/CONTRAST: CPT

## 2019-08-28 RX ADMIN — IOPAMIDOL 100 ML: 755 INJECTION, SOLUTION INTRAVENOUS at 09:06

## 2019-08-29 ENCOUNTER — TELEPHONE (OUTPATIENT)
Dept: INTERNAL MEDICINE CLINIC | Age: 69
End: 2019-08-29

## 2019-08-29 LAB
APPEARANCE UR: CLEAR
BACTERIA UR CULT: NORMAL
BASOPHILS # BLD AUTO: 0.1 X10E3/UL (ref 0–0.2)
BASOPHILS NFR BLD AUTO: 1 %
BILIRUB UR QL STRIP: NEGATIVE
COLOR UR: YELLOW
EOSINOPHIL # BLD AUTO: 0.3 X10E3/UL (ref 0–0.4)
EOSINOPHIL NFR BLD AUTO: 3 %
ERYTHROCYTE [DISTWIDTH] IN BLOOD BY AUTOMATED COUNT: 14.6 % (ref 12.3–15.4)
GLUCOSE UR QL: NEGATIVE
HCT VFR BLD AUTO: 40.9 % (ref 34–46.6)
HGB BLD-MCNC: 13.9 G/DL (ref 11.1–15.9)
HGB UR QL STRIP: NEGATIVE
IMM GRANULOCYTES # BLD AUTO: 0 X10E3/UL (ref 0–0.1)
IMM GRANULOCYTES NFR BLD AUTO: 0 %
KETONES UR QL STRIP: NEGATIVE
LEUKOCYTE ESTERASE UR QL STRIP: NEGATIVE
LYMPHOCYTES # BLD AUTO: 2 X10E3/UL (ref 0.7–3.1)
LYMPHOCYTES NFR BLD AUTO: 19 %
MCH RBC QN AUTO: 28.4 PG (ref 26.6–33)
MCHC RBC AUTO-ENTMCNC: 34 G/DL (ref 31.5–35.7)
MCV RBC AUTO: 84 FL (ref 79–97)
MICRO URNS: NORMAL
MONOCYTES # BLD AUTO: 1 X10E3/UL (ref 0.1–0.9)
MONOCYTES NFR BLD AUTO: 10 %
NEUTROPHILS # BLD AUTO: 7.2 X10E3/UL (ref 1.4–7)
NEUTROPHILS NFR BLD AUTO: 67 %
NITRITE UR QL STRIP: NEGATIVE
PH UR STRIP: 7 [PH] (ref 5–7.5)
PLATELET # BLD AUTO: 451 X10E3/UL (ref 150–450)
PROT UR QL STRIP: NEGATIVE
RBC # BLD AUTO: 4.89 X10E6/UL (ref 3.77–5.28)
SP GR UR: 1.02 (ref 1–1.03)
UROBILINOGEN UR STRIP-MCNC: 0.2 MG/DL (ref 0.2–1)
WBC # BLD AUTO: 10.6 X10E3/UL (ref 3.4–10.8)

## 2019-08-29 NOTE — TELEPHONE ENCOUNTER
Can you get her in within next week with [facility] to evaluate a new mass/cyst on right ovary - she needs to see a GYN and pelvic US - so hopefully they can coordinate that together?  Just let them know she will also need an US; pt is aware of results

## 2019-09-03 DIAGNOSIS — E78.5 HYPERLIPIDEMIA, UNSPECIFIED HYPERLIPIDEMIA TYPE: ICD-10-CM

## 2019-09-03 RX ORDER — LISINOPRIL AND HYDROCHLOROTHIAZIDE 20; 25 MG/1; MG/1
TABLET ORAL
Qty: 90 TAB | Refills: 0 | Status: SHIPPED | OUTPATIENT
Start: 2019-09-03 | End: 2019-12-01 | Stop reason: SDUPTHER

## 2019-09-04 ENCOUNTER — OFFICE VISIT (OUTPATIENT)
Dept: OBGYN CLINIC | Age: 69
End: 2019-09-04

## 2019-09-04 DIAGNOSIS — E78.5 HYPERLIPIDEMIA, UNSPECIFIED HYPERLIPIDEMIA TYPE: ICD-10-CM

## 2019-09-04 DIAGNOSIS — D25.9 UTERINE LEIOMYOMA, UNSPECIFIED LOCATION: Primary | ICD-10-CM

## 2019-09-04 RX ORDER — AMLODIPINE BESYLATE 10 MG/1
TABLET ORAL
Qty: 90 TAB | Refills: 0 | Status: SHIPPED | OUTPATIENT
Start: 2019-09-04 | End: 2019-12-01 | Stop reason: SDUPTHER

## 2019-09-04 NOTE — PROGRESS NOTES
Uterine Fibroids note    Chief Complaint   Right adnexal mass    HPI  History:  68 y.o. female,  No LMP recorded. Patient is postmenopausal., presents with incidental finding of a right adnexal mass seen on a CT done for ulcerative colitis symptoms. .  She denies any bleeding or pain. Current Method of Contraception is: post menopausal status      Ultrasound today shows:    UTERUS IS RETROVERTED, NORMAL IN SIZE AND HETEROGENOUS IN ECHOGENICITY. TWO FIBROIDS ARE SEEN AND MEASURED. FIBROID 1, ANTERIOR RIGHT  FIBROID 2, POSTERIOR RIGHT  ENDOMETRIUM MEASURES 3-4MM IN THICKNESS. NO EVIDENCE OF MASS OR ABNORMALITY SEEN  WITHIN THE ENDOMETRIAL CAVITY. RIGHT ADNEXA APPEARS WITHIN NORMAL LIMITS. LEFT ADNEXA APPEARS WITHIN NORMAL LIMITS. NO FREE FLUID SEEN IN THE CDS. Additional/Aggravating/Alleviating factors:  Associated symptoms: None    She denies Pelvic pressure, pelvic pain, dyspareunia, urinary pressure and frequency, low back pain, constipation    The patient reports the following aggravating factors: none  The patient the following alleviating factors: none     She reports the following past medical history that is notable: none  She had a CT in  (age 59) that did not see any fibroids. .        Past Medical History:   Diagnosis Date    Agatston coronary artery calcium score less than 100     CT Heart scan  - CAC score 4    Cancer ([address] Left    breast, s/p lumpectomy    Dyslipidemia     Labs 14 - chol 284, , HDL 73, , Apo B 146, LDL-P 2626, CRP 10    HTN (hypertension)     Malignant melanoma nos Left    arm    Mild carotid artery disease (HCC)     Nausea & vomiting     Nausea only    Ulcerative colitis      Past Surgical History:   Procedure Laterality Date    COLONOSCOPY N/A 2016    COLONOSCOPY performed by Nirmal Carter MD at [address] [place] [address] COLON, DIAGNOSTIC       polyp    HX BREAST LUMPECTOMY Left     lumpectomy     HX  SECTION Social History     Occupational History    Not on file   Tobacco Use    Smoking status: Never Smoker    Smokeless tobacco: Never Used   Substance and Sexual Activity    Alcohol use: No     Alcohol/week: 0.0 standard drinks    Drug use: No    Sexual activity: Yes     Family History   Problem Relation Age of Onset    Cancer Mother         lung    Asthma Father         emphysema    Cancer Brother         melanoma    Cancer Brother         thyroid     Allergies   Allergen Reactions    Erythromycin Hives     Prior to Admission medications    Medication Sig Start Date End Date Taking? Authorizing Provider   amLODIPine (NORVASC) 10 mg tablet TAKE 1 TABLET BY MOUTH DAILY 9/4/19   Monserrat Singer MD   lisinopril-hydroCHLOROthiazide (PRINZIDE, ZESTORETIC) 20-25 mg per tablet TAKE 1 TABLET BY MOUTH EVERY DAY 9/3/19   Monserrat Singer MD   amoxicillin-clavulanate (AUGMENTIN) 875-125 mg per tablet Take 1 Tab by mouth every twelve (12) hours. 8/27/19   Zenia Musa MD   rosuvastatin (CRESTOR) 10 mg tablet Take 1 Tab by mouth nightly. 8/20/19   Love Wilson MD   meclizine (ANTIVERT) 25 mg tablet TAKE 1 TABLET BY MOUTH THREE TIMES DAILY FOR UP TO 10 DAYS AS NEEDED 6/24/18   Monserrat Singer MD   cholecalciferol (VITAMIN D3) 400 unit tab tablet Take 600 Units by mouth daily. Provider, Historical   predniSONE (DELTASONE) 5 mg tablet Take  by mouth as needed. Sliding scale with flare ups. 6/5/14   Provider, Historical   CALCIUM PO Take 1,000 mg by mouth. Provider, Historical   COLAZAL PO Take 5 tablets by mouth daily.     Provider, Historical        Review of Systems - History obtained from the patient  Constitutional: negative for weight loss, fever, night sweats  HEENT: negative for hearing loss, earache, congestion, snoring, sorethroat  CV: negative for chest pain, palpitations, edema  Resp: negative for cough, shortness of breath, wheezing  Breast: negative for breast lumps, nipple discharge, galactorrhea  GI: negative for change in bowel habits, abdominal pain, black or bloody stools  : negative for frequency, dysuria, hematuria, vaginal discharge  MSK: negative for back pain, joint pain, muscle pain  Skin: negative for itching, rash, hives  Neuro: negative for dizziness, headache, confusion, weakness  Psych: negative for anxiety, depression, change in mood  Heme/lymph: negative for bleeding, bruising, pallor    Objective: There were no vitals taken for this visit. PHYSICAL EXAMINATION    Constitutional  · Appearance: well-nourished, well developed, alert, in no acute distress      Neurologic/Psychiatric  · Mental Status:  · Orientation: grossly oriented to person, place and time  · Mood and Affect: mood normal, affect appropriate    Assessment:   Uterine fibroids-asymptomatic      Plan:  Due to the fact that a CT at age 59 did not report any fibroids and she has some now, she will RTO in 3 months to check for any growth in the fibroids.     Total face to face time was 15 minutes and over half of the time was for counseling

## 2019-09-28 NOTE — TELEPHONE ENCOUNTER
Patient:   HANNAH MALLOY            MRN: [id number]            FIN: [id number]               Age:   46 hours     Sex:  MALE     :  17   Associated Diagnoses:   Liveborn infant, of luciano pregnancy, born in hospital by vaginal delivery   Author:   DEJA GOULD      Basic Information   Admitted from:  Labor and delivery.    Present at bedside:  Mother.       Review of Systems   Per parents, baby (Veena) is doing well. Mother's second child. Cluster feeding now.   Breastfeeding Q2-3H on demand  Voids: 2  Stools: 5      Health Status   Allergies:    Allergic Reactions (All)  NKA   Current medications:    Medications (1) Active  Scheduled: (0)  Continuous: (0)  PRN: (1)  Vitamin A & D ointment 5 gm  1 application, Topical, As Directed PRN        Histories      Maternal History   Include maternal history : OB DOCUMENTATION FLOWSHEET   17 13:05 Apgar 1 Minute by History 9    Apgar 5 Minute by History 9   17 13:00 Date/Time of Birth 17 13:00    Delivery Type Birth Vaginal, Spontaneous     Presentation at Delivery Left Occiput Anterior (BULAMRO)    EGA at Birth 40 5/7 weeks    Maternal Delivery Complications None    Maternal Premature Rupture of Membranes No    Maternal Prolonged Rupture of Membranes No    Maternal Precipitous Labor No    Maternal Prolonged Labor No    Gender Female    Birth Length 48 cm    Birth Weight 3.115 kg    Birth Head Circumference 34.5 cm    Size For Gestational Age (or GA) Appropriate For Gestational Age    Delivery Complications Fetus None   17 12:20 Maternal Rupture Of Membranes Type Spontaneous Rupture    Maternal Rupture Of Membranes Date/Time 17 12:20    Maternal Amniotic Fluid Color Clear   17 07:45 Maternal Risk Factors in Utero Advanced Maternal Age, Other: Rh Negative, Vanishing Twin    Feeding Preference Exclusive Breast Milk    Maternal  3    Maternal Para 1    Maternal Blood Type Transcribed O Negative    Maternal  Patient wants to speak with nurse regarding changing her medication to some thing else.  Her no is [phone] HBsAg Transcribed Negative    Maternal RPR Transcribed Non Reactive    Maternal Rubella Transcribed Non Immune    Maternal HIV Transcribed Negative    Maternal Group B Strep Transcribed Positive    Maternal Rhogam Date Transcribed 10/14/16    Maternal Chlamydia Transcribed Negative    Maternal Gonorrhea Transcribed Negative      Avery information     Avery   17 00:01 % Weight Change from Birth -7.077139   17 00:01 % Weight Change from Birth -3.232487   .     Family History:    No family history items have been selected or recorded.      Physical Examination   VS/Measurements   Measurements from flowsheet : Height and Weight   17 00:01 CLINICALWEIGHT 2.885 kg    Weight Method Measured    Weight Scale Infant Scale   17 00:01 CLINICALWEIGHT 3.015 kg    Weight Method Measured    Weight Scale Infant Scale      ,      Vitals between:   2017 09:07:15   TO   2017 09:07:15                   LAST RESULT MINIMUM MAXIMUM  Temperature 36.8 36.8 36.8  Heart Rate 142 142 144  Respiratory Rate 48 45 48     General:  No acute distress, Alert, Responsive.    Eye:  Pupils are equal, round and reactive to light, Normal conjunctiva.         Red reflex: Bilaterally, Present.    HENT:  Normocephalic, Nares patent, Anterior fontanelle open/soft/flat, Ears normally set and rotated, Palate intact.    Neck:  Full range of motion, Clavicles intact.    Respiratory:  Lungs are clear to auscultation, Respirations are non-labored, Breath sounds are equal, Symmetrical chest wall expansion.    Cardiovascular:  Normal rate, Regular rhythm, No murmur, 2+ brachial and femoral pulses b/l.    Gastrointestinal:  Soft, Non-tender, Non-distended, Normal bowel sounds, Anus patent.    Genitourinary:  Normal genitalia for age and sex.    Musculoskeletal     Normal range of motion.     No hip clicks.     Normal Vargas's.     Normal Ortolani's.     Integumentary:  Warm, Dry, No rash.    Neurologic:  Alert, Moves all  extremities appropriately, Martha, rooting, sucking reflexes are normal, Hand grasp present, Toe grasp present.           Review / Management   Results review:       Labs between:  2017 09:07 to 2017 09:07    CMP:                 AST  ALT  AlkPhos  Bili  Albumin   2017       (H) 9.3     2017       (H) 7.3                    .       Impression and Plan   Plan:   AGA female born at 40+5 via VD. +Maternal h/o HSV (no active lesions at time of delivery)    FEN:   Breastmilk po ad sofía   Lactation consultation provided  D/C weight: 2885gm (-7.4%)    Resp:   Stable on RA    CV:   Pre-ductal = 99  Post-ductal = 100    Heme:  Mom: O- s/p rhogam  Baby: O+/C-  TSB: 9.3mg/dL @ 42H (LIR)    ID:   +Maternal h/o HSV   -no active lesions at time of delivery   -On acyclovir  Maternal GBS (positive)    -s/p ampicllin x 2  Serologies (-)  First Hep B immunization given    Hearing:  Hearing screening passed B/L    Metabolic:  NBS obtained @ 24H of life    Soc:  Anticipatory guidance discussed    F/U:  Pediatrician: [facility] [address]   follow up date:   at 8:30am      Diagnosis     Liveborn infant, of luciano pregnancy, born in hospital by vaginal delivery (INK75-MU Z38.00, Diagnosis, Medical).              Electronically Signed On 2017 11:10  __________________________________________________   DEJA GOULD

## 2019-10-14 ENCOUNTER — OFFICE VISIT (OUTPATIENT)
Dept: INTERNAL MEDICINE CLINIC | Age: 69
End: 2019-10-14

## 2019-10-14 VITALS
TEMPERATURE: 97.9 F | HEIGHT: 64 IN | OXYGEN SATURATION: 98 % | SYSTOLIC BLOOD PRESSURE: 126 MMHG | WEIGHT: 159 LBS | DIASTOLIC BLOOD PRESSURE: 79 MMHG | BODY MASS INDEX: 27.14 KG/M2 | RESPIRATION RATE: 16 BRPM | HEART RATE: 84 BPM

## 2019-10-14 DIAGNOSIS — E78.5 HYPERLIPIDEMIA, UNSPECIFIED HYPERLIPIDEMIA TYPE: ICD-10-CM

## 2019-10-14 DIAGNOSIS — N28.89 RENAL MASS: ICD-10-CM

## 2019-10-14 DIAGNOSIS — M54.2 NECK PAIN: ICD-10-CM

## 2019-10-14 DIAGNOSIS — I10 ESSENTIAL HYPERTENSION: Primary | ICD-10-CM

## 2019-10-14 RX ORDER — PREDNISONE 20 MG/1
40 TABLET ORAL
Qty: 10 TAB | Refills: 0 | Status: SHIPPED | OUTPATIENT
Start: 2019-10-14 | End: 2020-07-07 | Stop reason: ALTCHOICE

## 2019-10-14 NOTE — PROGRESS NOTES
HISTORY OF PRESENT ILLNESS  Yu Bundy is a 68 y.o. female. HPI  Neck pain: Pt reports that she started experiencing severe neck pain and stiffness. She notes no relief with Aleve but vast relief with Prednisone. She took 4 Prednisone tablets (10 mg) last night. Hypertension ROS: taking medications as instructed, no medication side effects noted, no TIA's, no chest pain on exertion, no dyspnea on exertion, no swelling of ankles. New concerns: BP in office today is 126/79. Hyperlipidemia:  Cardiovascular risk analysis - 68 y.o. female LDL goal is under 130. ROS: taking medications as instructed, no medication side effects noted, no TIA's, no chest pain on exertion, no dyspnea on exertion, no swelling of ankles. Tolerating meds, no myalgias or other side effects noted  New concerns: Pt's last LDL was 80 on 6/14/18. Pt reports she went to the GYN for her pelvic US. She was informed to f/u in December. Pt reports that a renal mass was found and she is supposed to f/u with a specialist. She does not think it is necessary. Review of Systems   Musculoskeletal: Positive for neck pain. All other systems reviewed and are negative. Physical Exam   Constitutional: She is oriented to person, place, and time. She appears well-developed and well-nourished. HENT:   Head: Normocephalic and atraumatic. Right Ear: External ear normal.   Left Ear: External ear normal.   Nose: Nose normal.   Mouth/Throat: Oropharynx is clear and moist.   Eyes: Pupils are equal, round, and reactive to light. Conjunctivae and EOM are normal.   Neck: Normal range of motion. Neck supple. Cardiovascular: Normal rate, regular rhythm, normal heart sounds and intact distal pulses. Pulmonary/Chest: Effort normal and breath sounds normal. Right breast exhibits no inverted nipple, no mass, no nipple discharge, no skin change and no tenderness.  Left breast exhibits no inverted nipple, no mass, no nipple discharge, no skin change and no tenderness. No breast swelling, tenderness, discharge or bleeding. Breasts are symmetrical.   Abdominal: Soft. Bowel sounds are normal.   Genitourinary: Rectum normal and vagina normal. Rectal exam shows anal tone normal and guaiac negative stool. No breast swelling, tenderness, discharge or bleeding. Musculoskeletal: Normal range of motion. Neurological: She is alert and oriented to person, place, and time. Skin: Skin is warm and dry. Psychiatric: She has a normal mood and affect. Her behavior is normal. Judgment and thought content normal.   Nursing note and vitals reviewed. ASSESSMENT and PLAN  Diagnoses and all orders for this visit:    1. Essential hypertension  BP is at goal. I do not recommend any change in medications. 2. Hyperlipidemia, unspecified hyperlipidemia type  Stable, patient is tolerating medications, no myalgias. I do not recommend any change in medications. 3. Neck pain  Prescribed Prednisone. Informed pt that jamshid-term usage of Prednisone and can bone density issues. Will continue to monitor for improvements or changes. -     predniSONE (DELTASONE) 20 mg tablet; Take 40 mg by mouth daily (with breakfast). 4. Renal mass  Advised pt to f/u with specialist, since it was recommended. Explained to pt that the mass could grow and cause issues and it is important to keep up with her scans. Will continue to monitor for improvements or changes. Lab results and schedule of future lab studies reviewed with patient. Reviewed diet, exercise and weight control. Written by Clinton Reza, as dictated by Ange Maciel MD.     Current diagnosis and concerns discussed with pt at length. Understands risks and benefits or current treatment plan and medications and accepts the treatment and medication with any possible risks. Pt asks appropriate questions which were answered. Pt instructed to call with any concerns or problems.     This note will not be viewable in [address]

## 2019-10-24 ENCOUNTER — HOSPITAL ENCOUNTER (OUTPATIENT)
Dept: MRI IMAGING | Age: 69
Discharge: HOME | End: 2019-10-24
Attending: INTERNAL MEDICINE
Payer: MEDICARE

## 2019-10-24 DIAGNOSIS — N28.89 RENAL MASS: ICD-10-CM

## 2019-10-24 PROCEDURE — A9585 GADOBUTROL INJECTION: HCPCS | Performed by: INTERNAL MEDICINE

## 2019-10-24 PROCEDURE — 74183 MRI ABD W/O CNTR FLWD CNTR: CPT

## 2019-10-24 PROCEDURE — 74011250636 HC RX REV CODE- 250/636: Performed by: INTERNAL MEDICINE

## 2019-10-24 RX ADMIN — GADOBUTROL 7 ML: 604.72 INJECTION INTRAVENOUS at 08:39

## 2019-10-25 ENCOUNTER — TELEPHONE (OUTPATIENT)
Dept: INTERNAL MEDICINE CLINIC | Age: 69
End: 2019-10-25

## 2019-10-25 NOTE — TELEPHONE ENCOUNTER
Bibi Albarado- radiologist called me regarding her MRI showing a possible right breast cancer. I called patient and we need to order a diagnostic mammogram bilateral and US of right breast at [facility]. I do not want to put it in system as that will trigger Fahad gonzalez to call her and want to see what [facility] needs from us. Patient is aware we will be calling over there today and sending orders.

## 2019-10-25 NOTE — TELEPHONE ENCOUNTER
Evelia Addison with [facility] is returning a call to the nurse , states she will contact patient to scheduled for a bilateral mammogram,  States patient did have one completed in May so depending on insurance a note from PCP may be required.  Requesting the appropriate order be faxed to her at [phone] , mentioned it should be a DX mammo order

## 2019-10-25 NOTE — TELEPHONE ENCOUNTER
Left message on v/m at [facility] to return my call in regards to scheduling diagnostic mammogram and ultrasound.

## 2019-10-29 ENCOUNTER — TELEPHONE (OUTPATIENT)
Dept: INTERNAL MEDICINE CLINIC | Age: 69
End: 2019-10-29

## 2019-10-29 NOTE — TELEPHONE ENCOUNTER
Pt would like a call abck from Dr Willy Beltran to her cell number, wants to speak to her about testing on thursday

## 2019-10-29 NOTE — TELEPHONE ENCOUNTER
Discussed with patient and she is going to bring her MRI to [address] to get their opinion if they see anything as she feels she has had so many tests done and her last mammogram showed nothing so she is concerned about too many tests and potential for biopsy- I told her she is welcome to take MRI and see if the radiologist can tell from looking at it what is concerning

## 2019-11-21 ENCOUNTER — TELEPHONE (OUTPATIENT)
Dept: INTERNAL MEDICINE CLINIC | Age: 69
End: 2019-11-21

## 2019-11-22 NOTE — TELEPHONE ENCOUNTER
Not sure if you get phone messages- but the patient had her MRI breast and US and biospy and you were exactly right- she does have a breast cancer - thank you for taking the time and looking- she is very appreciative to me for making her do it but I told her it was you that found it!

## 2019-12-01 DIAGNOSIS — E78.5 HYPERLIPIDEMIA, UNSPECIFIED HYPERLIPIDEMIA TYPE: ICD-10-CM

## 2019-12-01 RX ORDER — LISINOPRIL AND HYDROCHLOROTHIAZIDE 20; 25 MG/1; MG/1
TABLET ORAL
Qty: 90 TAB | Refills: 0 | Status: SHIPPED | OUTPATIENT
Start: 2019-12-01 | End: 2020-02-27

## 2019-12-01 RX ORDER — AMLODIPINE BESYLATE 10 MG/1
TABLET ORAL
Qty: 90 TAB | Refills: 0 | Status: SHIPPED | OUTPATIENT
Start: 2019-12-01 | End: 2020-02-27

## 2020-02-27 DIAGNOSIS — E78.5 HYPERLIPIDEMIA, UNSPECIFIED HYPERLIPIDEMIA TYPE: ICD-10-CM

## 2020-02-27 RX ORDER — AMLODIPINE BESYLATE 10 MG/1
TABLET ORAL
Qty: 90 TAB | Refills: 0 | Status: SHIPPED | OUTPATIENT
Start: 2020-02-27 | End: 2020-05-30

## 2020-02-27 RX ORDER — LISINOPRIL AND HYDROCHLOROTHIAZIDE 20; 25 MG/1; MG/1
TABLET ORAL
Qty: 90 TAB | Refills: 0 | Status: SHIPPED | OUTPATIENT
Start: 2020-02-27 | End: 2020-05-30

## 2020-03-07 DIAGNOSIS — R42 VERTIGO: ICD-10-CM

## 2020-03-08 RX ORDER — MECLIZINE HYDROCHLORIDE 25 MG/1
TABLET ORAL
Qty: 30 TAB | Refills: 3 | Status: SHIPPED | OUTPATIENT
Start: 2020-03-08 | End: 2020-07-07 | Stop reason: SDUPTHER

## 2020-05-08 DIAGNOSIS — M54.2 NECK PAIN: ICD-10-CM

## 2020-05-08 RX ORDER — PREDNISONE 20 MG/1
TABLET ORAL
Qty: 10 TAB | Refills: 0 | OUTPATIENT
Start: 2020-05-08

## 2020-05-08 RX ORDER — PREDNISONE 20 MG/1
40 TABLET ORAL
Qty: 10 TAB | Refills: 0 | OUTPATIENT
Start: 2020-05-08

## 2020-05-30 DIAGNOSIS — E78.5 HYPERLIPIDEMIA, UNSPECIFIED HYPERLIPIDEMIA TYPE: ICD-10-CM

## 2020-05-30 RX ORDER — AMLODIPINE BESYLATE 10 MG/1
TABLET ORAL
Qty: 30 TAB | Refills: 0 | Status: SHIPPED | OUTPATIENT
Start: 2020-05-30 | End: 2020-07-07 | Stop reason: SDUPTHER

## 2020-05-30 RX ORDER — LISINOPRIL AND HYDROCHLOROTHIAZIDE 20; 25 MG/1; MG/1
TABLET ORAL
Qty: 30 TAB | Refills: 0 | Status: SHIPPED | OUTPATIENT
Start: 2020-05-30 | End: 2020-05-31

## 2020-05-30 RX ORDER — AMLODIPINE BESYLATE 10 MG/1
TABLET ORAL
Qty: 30 TAB | Refills: 0 | Status: SHIPPED | OUTPATIENT
Start: 2020-05-30 | End: 2020-05-31

## 2020-05-30 RX ORDER — LISINOPRIL AND HYDROCHLOROTHIAZIDE 20; 25 MG/1; MG/1
TABLET ORAL
Qty: 30 TAB | Refills: 0 | Status: SHIPPED | OUTPATIENT
Start: 2020-05-30 | End: 2020-07-07 | Stop reason: SDUPTHER

## 2020-05-31 DIAGNOSIS — E78.5 HYPERLIPIDEMIA, UNSPECIFIED HYPERLIPIDEMIA TYPE: ICD-10-CM

## 2020-05-31 RX ORDER — LISINOPRIL AND HYDROCHLOROTHIAZIDE 20; 25 MG/1; MG/1
TABLET ORAL
Qty: 30 TAB | Refills: 0 | Status: SHIPPED | OUTPATIENT
Start: 2020-05-31 | End: 2020-06-02

## 2020-05-31 RX ORDER — AMLODIPINE BESYLATE 10 MG/1
TABLET ORAL
Qty: 30 TAB | Refills: 0 | Status: SHIPPED | OUTPATIENT
Start: 2020-05-31 | End: 2020-06-02

## 2020-06-01 DIAGNOSIS — E78.5 HYPERLIPIDEMIA, UNSPECIFIED HYPERLIPIDEMIA TYPE: ICD-10-CM

## 2020-06-01 RX ORDER — AMLODIPINE BESYLATE 10 MG/1
TABLET ORAL
Qty: 90 TAB | OUTPATIENT
Start: 2020-06-01

## 2020-06-01 RX ORDER — LISINOPRIL AND HYDROCHLOROTHIAZIDE 20; 25 MG/1; MG/1
TABLET ORAL
Qty: 90 TAB | OUTPATIENT
Start: 2020-06-01

## 2020-06-02 RX ORDER — AMLODIPINE BESYLATE 10 MG/1
TABLET ORAL
Qty: 14 TAB | Refills: 0 | Status: SHIPPED | OUTPATIENT
Start: 2020-06-02 | End: 2021-01-29 | Stop reason: SDUPTHER

## 2020-06-02 RX ORDER — LISINOPRIL AND HYDROCHLOROTHIAZIDE 20; 25 MG/1; MG/1
TABLET ORAL
Qty: 14 TAB | Refills: 0 | Status: SHIPPED | OUTPATIENT
Start: 2020-06-02 | End: 2020-08-25 | Stop reason: SDUPTHER

## 2020-07-07 ENCOUNTER — VIRTUAL VISIT (OUTPATIENT)
Dept: INTERNAL MEDICINE CLINIC | Age: 70
End: 2020-07-07

## 2020-07-07 DIAGNOSIS — E78.5 HYPERLIPIDEMIA, UNSPECIFIED HYPERLIPIDEMIA TYPE: ICD-10-CM

## 2020-07-07 DIAGNOSIS — R42 VERTIGO: ICD-10-CM

## 2020-07-07 DIAGNOSIS — C50.919 MALIGNANT NEOPLASM OF FEMALE BREAST, UNSPECIFIED ESTROGEN RECEPTOR STATUS, UNSPECIFIED LATERALITY, UNSPECIFIED SITE OF BREAST (HCC): ICD-10-CM

## 2020-07-07 DIAGNOSIS — I10 ESSENTIAL HYPERTENSION: Primary | ICD-10-CM

## 2020-07-07 RX ORDER — ROSUVASTATIN CALCIUM 10 MG/1
10 TABLET, COATED ORAL
Qty: 90 TAB | Refills: 3 | Status: SHIPPED | OUTPATIENT
Start: 2020-07-07 | End: 2021-09-22

## 2020-07-07 RX ORDER — AMLODIPINE BESYLATE 10 MG/1
10 TABLET ORAL DAILY
Qty: 90 TAB | Refills: 1 | Status: SHIPPED | OUTPATIENT
Start: 2020-07-07 | End: 2020-08-25 | Stop reason: SDUPTHER

## 2020-07-07 RX ORDER — LISINOPRIL AND HYDROCHLOROTHIAZIDE 20; 25 MG/1; MG/1
1 TABLET ORAL DAILY
Qty: 90 TAB | Refills: 1 | Status: SHIPPED | OUTPATIENT
Start: 2020-07-07 | End: 2021-01-29 | Stop reason: SDUPTHER

## 2020-07-07 RX ORDER — LETROZOLE 2.5 MG/1
TABLET, FILM COATED ORAL
COMMUNITY
Start: 2020-06-22

## 2020-07-07 RX ORDER — MECLIZINE HYDROCHLORIDE 25 MG/1
25 TABLET ORAL
Qty: 30 TAB | Refills: 3 | Status: SHIPPED | OUTPATIENT
Start: 2020-07-07 | End: 2022-04-14 | Stop reason: SDUPTHER

## 2020-07-07 RX ORDER — LATANOPROST 50 UG/ML
SOLUTION/ DROPS OPHTHALMIC
COMMUNITY
Start: 2020-06-25 | End: 2022-10-19 | Stop reason: ALTCHOICE

## 2020-07-07 NOTE — PROGRESS NOTES
HISTORY OF PRESENT ILLNESS  Darryl Price is a 65 y.o. female who is present, aware, and consenting for real-time synchronous virtual video visit through [employer]. HPI   Hypertension ROS: taking medications as instructed, no medication side effects noted, no TIA's, no chest pain on exertion, no dyspnea on exertion, no swelling of ankles. New concerns: Pt notes that her BP has been stable. Pt reports that she has not been regularly exercising. Continues on lisinopril-HCTZ, amlodipine. Hyperlipidemia:  Cardiovascular risk analysis - 65 y.o. female LDL goal is under 130. ROS: taking medications as instructed, no medication side effects noted, no TIA's, no chest pain on exertion, no dyspnea on exertion, no swelling of ankles. Tolerating meds, no myalgias or other side effects noted. New concerns: Pt's last LDL was 80 on 6/13/18. Continues on Crestor. Breast Cancer: Followed by Dr. Tosah Kerns. Pt recently was started on Femera and notes some sfx (hot flashes). Pt is getting DEXA and mammogram in the near future. Vertigo: Stable, pt continues to comply with Antivert PRN. Health maintenance: Pt is not up to date with pneumonia injection. Review of Systems   All other systems reviewed and are negative. Physical Exam  Vitals signs reviewed. Constitutional:       General: She is not in acute distress. Appearance: Normal appearance. She is not ill-appearing, toxic-appearing or diaphoretic. HENT:      Right Ear: Hearing normal.      Left Ear: Hearing normal.      Nose: Nose normal.      Mouth/Throat:      Mouth: Mucous membranes are moist.      Pharynx: Oropharynx is clear. Eyes:      Conjunctiva/sclera: Conjunctivae normal.   Neck:      Musculoskeletal: Normal range of motion. Pulmonary:      Effort: No respiratory distress. Breath sounds: Normal air entry. Musculoskeletal: Normal range of motion. Skin:     General: Skin is warm and dry.    Neurological:      General: No focal deficit present. Mental Status: She is alert and oriented to person, place, and time. Mental status is at baseline. Psychiatric:         Mood and Affect: Mood normal.         Behavior: Behavior normal.         Thought Content: Thought content normal.         Judgment: Judgment normal.         ASSESSMENT and PLAN  Diagnoses and all orders for this visit:    1. Essential hypertension  BP presumed at goal. I do not recommend any change in medications. Asked pt to monitor her BP at home. 2. Hyperlipidemia, unspecified hyperlipidemia type  Presumed stable. Patient is tolerating medications with no myalgias. I do not recommend any change in treatment plan. 3. Vertigo  Stable and well-managed with Antivert PRN. No change in medications. 4. Malignant neoplasm of female breast, unspecified estrogen receptor status, unspecified laterality, unspecified site of breast ([facility] [address]  Followed by Dr. Ileana Zaman. Stable and well-managed. No change in medications. Pt notes that she has been able to follow with oncologist in person. Additional comments: Asked pt to get a flu shot in the Fall. Lab results and schedule of future lab studies reviewed with patient. Reviewed diet, exercise and weight control. Written by Neela Murillo, as dictated by Esteban Deal MD.     Current diagnosis and concerns discussed with pt at length. Understands risks and benefits or current treatment plan and medications and accepts the treatment and medication with any possible risks. Pt asks appropriate questions which were answered. Pt instructed to call with any concerns or problems.

## 2020-07-13 ENCOUNTER — TELEPHONE (OUTPATIENT)
Dept: INTERNAL MEDICINE CLINIC | Age: 70
End: 2020-07-13

## 2020-07-13 RX ORDER — METHYLPREDNISOLONE 4 MG/1
TABLET ORAL
Qty: 1 DOSE PACK | Refills: 0 | Status: SHIPPED | OUTPATIENT
Start: 2020-07-13 | End: 2020-12-24

## 2020-07-13 NOTE — TELEPHONE ENCOUNTER
Spoke with patient and advised her that rx was sent to her pharmacy. Pt understood and was thankful for the call.

## 2020-07-13 NOTE — TELEPHONE ENCOUNTER
Pt requesting prednisone as she has had a stiff neck since Friday and was given this by PCP in the past. Please send to [facility]. Thanks.

## 2020-08-25 ENCOUNTER — OFFICE VISIT (OUTPATIENT)
Dept: CARDIOLOGY CLINIC | Age: 70
End: 2020-08-25
Payer: MEDICARE

## 2020-08-25 VITALS
BODY MASS INDEX: 26.46 KG/M2 | OXYGEN SATURATION: 95 % | DIASTOLIC BLOOD PRESSURE: 56 MMHG | SYSTOLIC BLOOD PRESSURE: 112 MMHG | WEIGHT: 155 LBS | HEIGHT: 64 IN | HEART RATE: 69 BPM

## 2020-08-25 DIAGNOSIS — E78.5 HYPERLIPIDEMIA, UNSPECIFIED HYPERLIPIDEMIA TYPE: ICD-10-CM

## 2020-08-25 DIAGNOSIS — I10 ESSENTIAL HYPERTENSION: Primary | ICD-10-CM

## 2020-08-25 PROCEDURE — G8419 CALC BMI OUT NRM PARAM NOF/U: HCPCS | Performed by: SPECIALIST

## 2020-08-25 PROCEDURE — G8752 SYS BP LESS 140: HCPCS | Performed by: SPECIALIST

## 2020-08-25 PROCEDURE — G8754 DIAS BP LESS 90: HCPCS | Performed by: SPECIALIST

## 2020-08-25 PROCEDURE — 1090F PRES/ABSN URINE INCON ASSESS: CPT | Performed by: SPECIALIST

## 2020-08-25 PROCEDURE — G8510 SCR DEP NEG, NO PLAN REQD: HCPCS | Performed by: SPECIALIST

## 2020-08-25 PROCEDURE — 99214 OFFICE O/P EST MOD 30 MIN: CPT | Performed by: SPECIALIST

## 2020-08-25 PROCEDURE — G0463 HOSPITAL OUTPT CLINIC VISIT: HCPCS | Performed by: SPECIALIST

## 2020-08-25 PROCEDURE — G8536 NO DOC ELDER MAL SCRN: HCPCS | Performed by: SPECIALIST

## 2020-08-25 PROCEDURE — G8399 PT W/DXA RESULTS DOCUMENT: HCPCS | Performed by: SPECIALIST

## 2020-08-25 PROCEDURE — 1101F PT FALLS ASSESS-DOCD LE1/YR: CPT | Performed by: SPECIALIST

## 2020-08-25 PROCEDURE — 93005 ELECTROCARDIOGRAM TRACING: CPT | Performed by: SPECIALIST

## 2020-08-25 PROCEDURE — G9899 SCRN MAM PERF RSLTS DOC: HCPCS | Performed by: SPECIALIST

## 2020-08-25 PROCEDURE — G8427 DOCREV CUR MEDS BY ELIG CLIN: HCPCS | Performed by: SPECIALIST

## 2020-08-25 PROCEDURE — 93010 ELECTROCARDIOGRAM REPORT: CPT | Performed by: SPECIALIST

## 2020-08-25 PROCEDURE — 3017F COLORECTAL CA SCREEN DOC REV: CPT | Performed by: SPECIALIST

## 2020-08-25 NOTE — PROGRESS NOTES
Chief Complaint   Patient presents with    Annual Exam    Cholesterol Problem    Hypertension     Visit Vitals  /56 (BP 1 Location: Left arm, BP Patient Position: Sitting)   Pulse 69   Ht 5' 4\" (1.626 m)   Wt 155 lb (70.3 kg)   SpO2 95%   BMI 26.61 kg/m²     Chest pain denied   SOB denied   Swelling in hands/feet denied   Dizziness denied   Recent hospital stays denied   Refills rosuvastatin, lisinopril    Dx with breast cancer; mastectomy in December

## 2020-08-25 NOTE — PROGRESS NOTES
Becca Ramos MD. [facility]              Patient: Rocio Pennington  : 1950      Today's Date: 2020            HISTORY OF PRESENT ILLNESS:     History of Present Illness:  No cardiac complaints. No CP or SOB. Just some colitis issues - better now. PAST MEDICAL HISTORY:     Past Medical History:   Diagnosis Date    Agatston coronary artery calcium score less than 100     CT Heart scan  - CAC score 4    Cancer ([address] Left    breast, s/p lumpectomy    Dyslipidemia     Labs 14 - chol 284, , HDL 73, , Apo B 146, LDL-P 2626, CRP 10    HTN (hypertension)     Malignant melanoma nos Left    arm    Mild carotid artery disease (HCC)     Nausea & vomiting     Nausea only    Ulcerative colitis          Past Surgical History:   Procedure Laterality Date    COLONOSCOPY N/A 2016    COLONOSCOPY performed by Don Miranda MD at [address] [place] [address], COLON, DIAGNOSTIC       polyp    HX BREAST LUMPECTOMY Left     lumpectomy     HX  SECTION             MEDICATIONS:     Current Outpatient Medications   Medication Sig Dispense Refill    methylPREDNISolone (MEDROL DOSEPACK) 4 mg tablet uad 1 Dose Pack 0    letrozole (FEMARA) 2.5 mg tablet TK 1 T PO QD      latanoprost (XALATAN) 0.005 % ophthalmic solution INSTILL 1 DROP IN BOTH EYES QHS      rosuvastatin (CRESTOR) 10 mg tablet Take 1 Tab by mouth nightly. 90 Tab 3    meclizine (ANTIVERT) 25 mg tablet Take 1 Tab by mouth three (3) times daily as needed for Dizziness. 30 Tab 3    lisinopril-hydroCHLOROthiazide (PRINZIDE, ZESTORETIC) 20-25 mg per tablet Take 1 Tab by mouth daily. 90 Tab 1    amLODIPine (NORVASC) 10 mg tablet TAKE 1 TABLET BY MOUTH DAILY 14 Tab 0    cholecalciferol (VITAMIN D3) 400 unit tab tablet Take 600 Units by mouth daily.  CALCIUM PO Take 1,000 mg by mouth.  COLAZAL PO Take 6 Tabs by mouth daily.          Allergies   Allergen Reactions    Erythromycin Hives SOCIAL HISTORY:     Social History     Tobacco Use    Smoking status: Never Smoker    Smokeless tobacco: Never Used   Substance Use Topics    Alcohol use: No     Alcohol/week: 0.0 standard drinks    Drug use: No         FAMILY HISTORY:     Family History   Problem Relation Age of Onset    Cancer Mother         lung    Asthma Father         emphysema    Cancer Brother         melanoma    Cancer Brother         thyroid             SOCIAL HISTORY:     Social History     Tobacco Use    Smoking status: Never Smoker    Smokeless tobacco: Never Used   Substance Use Topics    Alcohol use: No     Alcohol/week: 0.0 standard drinks    Drug use: No              REVIEW OF SYSTEMS:       Review of Systems:    Constitutional: Negative for fever, chills    HEENT: Negative for vision changes.    Respiratory: Negative for cough    Cardiovascular: Negative for orthopnea, syncope, and PND.    Gastrointestinal: Negative for melena    Genitourinary: Negative for dysuria    Musculoskeletal: Negative for myalgias.    Skin: Negative for rash    Heme: No problems bleeding.    Neurological: Negative for speech change and focal weakness.                    PHYSICAL EXAM:      Physical Exam:  Visit Vitals  /56 (BP 1 Location: Left arm, BP Patient Position: Sitting)   Pulse 69   Ht 5' 4\" (1.626 m)   Wt 155 lb (70.3 kg)   SpO2 95%   BMI 26.61 kg/m²          Patient appears generally well, mood and affect are appropriate and pleasant. HEENT:  Hearing intact, non-icteric, normocephalic, atraumatic. Neck Exam: Supple, No JVD   Lung Exam: Clear to auscultation, even breath sounds. Cardiac Exam: Regular rate and RQJCKY COEC 2/8 systolic murmur (radiating to carotids)  Abdomen: Soft, non-tender, normal bowel sounds. No bruits or masses. Extremities: Moves all ext well. No lower extremity edema. Vascular: 2+ dorsalis pedis pulses bilaterally.   Psych: Appropriate affect  Neuro - Grossly intact                   LABS / OTHER STUDIES:       [facility]  Lab Results   Component Value Date/Time    WBC 10.6 08/27/2019 03:58 PM    HGB 13.9 08/27/2019 03:58 PM    HCT 40.9 08/27/2019 03:58 PM    PLATELET 173 (H) 18/39/3586 03:58 PM    MCV 84 08/27/2019 03:58 PM       Lab Results   Component Value Date/Time    Cholesterol, total 193 06/13/2018 08:40 AM    Cholesterol, Total 184 08/26/2019 07:57 AM    HDL Cholesterol 81 06/13/2018 08:40 AM    LDL, calculated 80 06/13/2018 08:40 AM    VLDL, calculated 32 06/13/2018 08:40 AM    Triglyceride 159 (H) 06/13/2018 08:40 AM    CHOL/HDL Ratio 4.3 08/13/2010 06:54 PM     Lab Results   Component Value Date/Time    Sodium 142 08/26/2019 07:57 AM    Potassium 4.2 08/26/2019 07:57 AM    Chloride 97 08/26/2019 07:57 AM    CO2 24 08/26/2019 07:57 AM    Anion gap 18 04/12/2016 08:00 AM    Glucose 90 08/26/2019 07:57 AM    BUN 16 08/26/2019 07:57 AM    Creatinine 0.97 08/26/2019 07:57 AM    BUN/Creatinine ratio 16 08/26/2019 07:57 AM    GFR est AA 68 08/26/2019 07:57 AM    GFR est non-AA 59 (L) 08/26/2019 07:57 AM    Calcium 10.2 08/26/2019 07:57 AM    Bilirubin, total 0.4 08/26/2019 07:57 AM    Alk. phosphatase 65 08/26/2019 07:57 AM    Protein, total 7.5 08/26/2019 07:57 AM    Albumin 4.7 08/26/2019 07:57 AM    Globulin 3.3 08/13/2010 06:54 PM    A-G Ratio 1.7 08/26/2019 07:57 AM    ALT (SGPT) 13 08/26/2019 07:57 AM    AST (SGOT) 19 08/26/2019 07:57 AM     Lab Results   Component Value Date/Time    TSH 2.800 01/27/2016 12:00 AM              CARDIAC DIAGNOSTICS:       Cardiac Evaluation Includes:    CT Heart Scan 7/11 - CAC score 6    CT Heart scan 8/14 - CAC score 4  Carotid Dopplers 11/5/14 - 0-9% JEREMIAH and 69-56% LICA stenosis    Stress Echo 11/5/14 - walked 5 min. Normal stress EKG and stress Echo. Additional echo findings showed normal RV size and function, normal AV, MV, and TV. Trace MR and TR. PA pressure was 40 mmHg at rest and 45 mmHg after peak exercise.    Echo 12/18/16 - LVEF 55-60%, AV thickening.   Atrial septal aneurysm. Treadmill Stress Test 1/5/17 - walked 4:04 (7.0 METS), normal study   Carotid Dopplers 7/6/17 - 0-9% JEREMIAH and 98-13% LICA stenosis               EKG 9/14 (reviewed by me) - NSR, Non-specific ST changes    EKG 4/28/16 - NSR, normal   EKG 7/17/18 - NSR, non- non-specific T wave abn   EKG 8/20/19 - NSR, non-specific   EKG 8/25/20 - NSR, normal             ASSESSMENT AND PLAN:       Assessment and Plan:    1) History of atypical chest pain    - Very low CAC score (4) in 2014  - Stress test 1/17 was normal.   - No complaints of chest pain recently       2) HTN  - BP looks good - continue meds       3) Dyslipidemia -    - Labs 11/21/14 - chol 284, , HDL 73, , Apo B 146, LDL-P 2626, CRP 10  - Ms. F did initially not want to take a statin despite high lipids for years. Fortunately her CAC score has been low. Her Granger risk score gave her 8.2% 10 year CV risk.    - After much discussion, she agreed to take a statin and is tolerating it.     - Continue Crestor at 10 mg daily -- on a statin her lipids look great  - Will recheck lipids   - encouraged healthy diet and exercise       4) Slight murmur due to AV thickening     5) See me back in one year.  Patient expressed understanding of the plan - questions were answered.      On a side note, she has 2 kids. Nika Fofana is from [place].  She works for a . [place] [employer] summer 2019.               Baron Kia MD, [address] [place]  [address] [facility], [address]      [address]  [address]  [place], [address] [place] [address], [address] [place] [address]  Ph: [phone]                               [phone]

## 2020-09-24 RX ORDER — ROSUVASTATIN CALCIUM 10 MG/1
TABLET, COATED ORAL
Qty: 90 TAB | Refills: 3 | OUTPATIENT
Start: 2020-09-24

## 2020-09-24 NOTE — TELEPHONE ENCOUNTER
Per Dr. Blaine Rincon VO:  LOV:8/20/2019  Future Appointments   Date Time Provider Gabby Lewis   8/27/2021  8:20 AM Brock Celestin MD [facility] BS AMB     Requested Prescriptions     Pending Prescriptions Disp Refills    rosuvastatin (CRESTOR) 10 mg tablet [Pharmacy Med Name: ROSUVASTATIN 10MG TABLETS] 90 Tab 3     Sig: TAKE 1 TABLET BY MOUTH EVERY NIGHT

## 2020-12-23 ENCOUNTER — TELEPHONE (OUTPATIENT)
Dept: INTERNAL MEDICINE CLINIC | Age: 70
End: 2020-12-23

## 2020-12-23 NOTE — TELEPHONE ENCOUNTER
----- Message from Rossy VILLEGAS Bolton sent at 12/23/2020  8:16 AM EST -----  Regarding: Dr. Kieran Wilson Telephone  Reason for call: Requested a call back   Callback required yes/no and why: Yes  Best contact number(s):[phone]  Details to clarify the request: Pt would like a call back from Dr. Ady Santos or her nurse she is having some stiffness in her neck and would like some medication prescribed for it.

## 2020-12-23 NOTE — TELEPHONE ENCOUNTER
PSR  Called patient to advise of nurse's note. Patient scheduled VV with provider in practice for 12/24/2020. Patient very thankful for appointment and return call.

## 2020-12-23 NOTE — TELEPHONE ENCOUNTER
Appt is needed to address since provider is out of the office. Please assist with scheduling for next available with covering physician.  Visit can be virtual.

## 2020-12-24 ENCOUNTER — VIRTUAL VISIT (OUTPATIENT)
Dept: INTERNAL MEDICINE CLINIC | Age: 70
End: 2020-12-24
Payer: MEDICARE

## 2020-12-24 DIAGNOSIS — M54.2 NECK PAIN: Primary | ICD-10-CM

## 2020-12-24 PROCEDURE — 99441 PR PHYS/QHP TELEPHONE EVALUATION 5-10 MIN: CPT | Performed by: INTERNAL MEDICINE

## 2020-12-24 RX ORDER — PREDNISONE 10 MG/1
TABLET ORAL
Qty: 20 TAB | Refills: 0 | Status: SHIPPED | OUTPATIENT
Start: 2020-12-24 | End: 2021-01-01

## 2020-12-24 NOTE — PROGRESS NOTES
Dena Will is a 65 y.o. female evaluated via audio only technology on 12/24/2020. Consent: She and/or her health care decision maker is aware that she may receive a bill for this audio only encounter, depending on her insurance coverage, and has provided verbal consent to proceed: Yes    I communicated with the patient and/or health care decision maker about the nature and details of the following: neck pain    I affirm this is a Patient-Initiated Episode with a Patient who has not had a related appointment within my department in the past 7 days or scheduled within the next 24 hours. Total Time: minutes: 5-10 minutes    Assessment and Plan   Diagnoses and all orders for this visit:    1. Neck pain  -     predniSONE (DELTASONE) 10 mg tablet; Take 40 mg by mouth daily (with breakfast) for 2 days, THEN 30 mg daily (with breakfast) for 2 days, THEN 20 mg daily (with breakfast) for 2 days, THEN 10 mg daily (with breakfast) for 2 days. Other orders  -     DROPLET PLUS; Standing  Reports intermittent neck pain. Pain started up again on Tuesday. Notes stiffness on the right side of her neck with radiation to the top of her head. Has been using Tylenol with minimal improvement. No inciting events that she can think of. No numbness, tingling. Pain is worse with turning her head. Requesting prednisone as it has helped her significantly in the past.      We discussed the expected course, resolution and complications of the diagnosis(es) in detail. Medication risks, benefits, costs, interactions, and alternatives were discussed as indicated. I advised her to contact the office if her condition worsens, changes or fails to improve as anticipated. She expressed understanding with the diagnosis(es) and plan.      Return to clinic: As needed    Demetrio Mei MD  [facility]  12/24/2020    Future Appointments   Date Time Provider Gabby Lewis   12/24/2020 3:32 AM Gisell Huber MD [facility] BS AMB   8/27/2021  8:20 AM Ashlie Aponte MD [facility] BS AMB        Subjective   Chief Complaint   Neck pain    Holly Chaves is a 65 y.o. female         Review of Systems   Musculoskeletal: Positive for neck pain. Neurological: Negative for tingling.             Objective   Vitals:       Patient-Reported Vitals 12/24/2020   Patient-Reported Weight 149lb   Patient-Reported Height -   Patient-Reported Temperature 98.1   Patient-Reported Systolic  -   Patient-Reported Diastolic -                 Physical Exam  Constitutional:       Comments: No respiratory distress noted over the phone

## 2021-01-29 DIAGNOSIS — E78.5 HYPERLIPIDEMIA, UNSPECIFIED HYPERLIPIDEMIA TYPE: ICD-10-CM

## 2021-01-29 RX ORDER — AMLODIPINE BESYLATE 10 MG/1
TABLET ORAL
Qty: 30 TAB | Refills: 0 | Status: SHIPPED | OUTPATIENT
Start: 2021-01-29 | End: 2021-03-17

## 2021-01-29 RX ORDER — LISINOPRIL AND HYDROCHLOROTHIAZIDE 20; 25 MG/1; MG/1
1 TABLET ORAL DAILY
Qty: 30 TAB | Refills: 0 | Status: SHIPPED | OUTPATIENT
Start: 2021-01-29 | End: 2021-03-17

## 2021-03-25 ENCOUNTER — VIRTUAL VISIT (OUTPATIENT)
Dept: INTERNAL MEDICINE CLINIC | Age: 71
End: 2021-03-25
Payer: MEDICARE

## 2021-03-25 DIAGNOSIS — I10 ESSENTIAL HYPERTENSION: ICD-10-CM

## 2021-03-25 DIAGNOSIS — K51.211 ULCERATIVE PROCTITIS WITH RECTAL BLEEDING (HCC): ICD-10-CM

## 2021-03-25 DIAGNOSIS — C50.919 MALIGNANT NEOPLASM OF FEMALE BREAST, UNSPECIFIED ESTROGEN RECEPTOR STATUS, UNSPECIFIED LATERALITY, UNSPECIFIED SITE OF BREAST (HCC): ICD-10-CM

## 2021-03-25 DIAGNOSIS — M85.80 OSTEOPENIA, UNSPECIFIED LOCATION: ICD-10-CM

## 2021-03-25 DIAGNOSIS — E78.5 HYPERLIPIDEMIA, UNSPECIFIED HYPERLIPIDEMIA TYPE: ICD-10-CM

## 2021-03-25 DIAGNOSIS — Z00.00 MEDICARE ANNUAL WELLNESS VISIT, SUBSEQUENT: Primary | ICD-10-CM

## 2021-03-25 PROCEDURE — G8399 PT W/DXA RESULTS DOCUMENT: HCPCS | Performed by: INTERNAL MEDICINE

## 2021-03-25 PROCEDURE — G8756 NO BP MEASURE DOC: HCPCS | Performed by: INTERNAL MEDICINE

## 2021-03-25 PROCEDURE — 1090F PRES/ABSN URINE INCON ASSESS: CPT | Performed by: INTERNAL MEDICINE

## 2021-03-25 PROCEDURE — G8536 NO DOC ELDER MAL SCRN: HCPCS | Performed by: INTERNAL MEDICINE

## 2021-03-25 PROCEDURE — G8419 CALC BMI OUT NRM PARAM NOF/U: HCPCS | Performed by: INTERNAL MEDICINE

## 2021-03-25 PROCEDURE — G8432 DEP SCR NOT DOC, RNG: HCPCS | Performed by: INTERNAL MEDICINE

## 2021-03-25 PROCEDURE — 99214 OFFICE O/P EST MOD 30 MIN: CPT | Performed by: INTERNAL MEDICINE

## 2021-03-25 PROCEDURE — G0439 PPPS, SUBSEQ VISIT: HCPCS | Performed by: INTERNAL MEDICINE

## 2021-03-25 PROCEDURE — 3017F COLORECTAL CA SCREEN DOC REV: CPT | Performed by: INTERNAL MEDICINE

## 2021-03-25 PROCEDURE — G8427 DOCREV CUR MEDS BY ELIG CLIN: HCPCS | Performed by: INTERNAL MEDICINE

## 2021-03-25 PROCEDURE — G9899 SCRN MAM PERF RSLTS DOC: HCPCS | Performed by: INTERNAL MEDICINE

## 2021-03-25 PROCEDURE — G0463 HOSPITAL OUTPT CLINIC VISIT: HCPCS | Performed by: INTERNAL MEDICINE

## 2021-03-25 PROCEDURE — 1101F PT FALLS ASSESS-DOCD LE1/YR: CPT | Performed by: INTERNAL MEDICINE

## 2021-03-25 RX ORDER — PREDNISONE 10 MG/1
TABLET ORAL
Qty: 30 TAB | Refills: 0 | Status: SHIPPED | OUTPATIENT
Start: 2021-03-25 | End: 2021-04-15

## 2021-03-25 NOTE — PATIENT INSTRUCTIONS
Medicare Wellness Visit, Female The best way to live healthy is to have a lifestyle where you eat a well-balanced diet, exercise regularly, limit alcohol use, and quit all forms of tobacco/nicotine, if applicable. Regular preventive services are another way to keep healthy. Preventive services (vaccines, screening tests, monitoring & exams) can help personalize your care plan, which helps you manage your own care. Screening tests can find health problems at the earliest stages, when they are easiest to treat. [facility] follows the current, evidence-based guidelines published by the [facility] ([facility]) when recommending preventive services for our patients. Because we follow these guidelines, sometimes recommendations change over time as research supports it. (For example, mammograms used to be recommended annually. Even though Medicare will still pay for an annual mammogram, the newer guidelines recommend a mammogram every two years for women of average risk). Of course, you and your doctor may decide to screen more often for some diseases, based on your risk and your co-morbidities (chronic disease you are already diagnosed with). Preventive services for you include: - Medicare offers their members a free annual wellness visit, which is time for you and your primary care provider to discuss and plan for your preventive service needs. Take advantage of this benefit every year! 
-All adults over the age of 72 should receive the recommended pneumonia vaccines. Current USPSTF guidelines recommend a series of two vaccines for the best pneumonia protection.  
-All adults should have a flu vaccine yearly and a tetanus vaccine every 10 years.  
-All adults age 48 and older should receive the shingles vaccines (series of two vaccines).      
-All adults age 38-68 who are overweight should have a diabetes screening test once every three years.  
-All adults born between [birth year] and 1967 should be screened once for Hepatitis C. 
-Other screening tests and preventive services for persons with diabetes include: an eye exam to screen for diabetic retinopathy, a kidney function test, a foot exam, and stricter control over your cholesterol.  
-Cardiovascular screening for adults with routine risk involves an electrocardiogram (ECG) at intervals determined by your doctor.  
-Colorectal cancer screenings should be done for adults age 54-65 with no increased risk factors for colorectal cancer. There are a number of acceptable methods of screening for this type of cancer. Each test has its own benefits and drawbacks. Discuss with your doctor what is most appropriate for you during your annual wellness visit. The different tests include: colonoscopy (considered the best screening method), a fecal occult blood test, a fecal DNA test, and sigmoidoscopy. 
 
-A bone mass density test is recommended when a woman turns 65 to screen for osteoporosis. This test is only recommended one time, as a screening. Some providers will use this same test as a disease monitoring tool if you already have osteoporosis. -Breast cancer screenings are recommended every other year for women of normal risk, age 54-69. 
-Cervical cancer screenings for women over age 72 are only recommended with certain risk factors. Here is a list of your current Health Maintenance items (your personalized list of preventive services) with a due date: 
Health Maintenance Due Topic Date Due  
 COVID-19 Vaccine (1) Never done  Shingles Vaccine (1 of 2) Never done  Pneumococcal Vaccine (1 of 1 - PPSV23) Never done  Cholesterol Test   08/26/2020 [facility] Annual Well Visit  08/27/2020  Yearly Flu Vaccine (1) Never done  Mammogram  10/31/2020

## 2021-03-25 NOTE — PROGRESS NOTES
Chandler Mckeon (: 1950) is a 65 y.o. female, established patient, here for evaluation of the following chief complaint(s):   Medication Evaluation (med refill)       ASSESSMENT/PLAN:  1. Medicare annual wellness visit, subsequent  2. Essential hypertension  BP is at goal. I do not recommend any change in amlodipine and lisinopril-HCTZ. 3. Hyperlipidemia, unspecified hyperlipidemia type  Stable, patient is tolerating medications, no myalgias. I do not recommend any change in Crestor. 4. Malignant neoplasm of female breast, unspecified estrogen receptor status, unspecified laterality, unspecified site of breast ([facility] [address]  Stable and well-managed. Continue with ongoing regimen of Famera. Waiting on mammogram results to be sent. 5. Ulcerative proctitis with rectal bleeding (HCC)  Not at goal with flare onset. Rx prednisone taper and directed pt to contact GI to discuss flare. Will continue to monitor for improvements or changes. SUBJECTIVE/OBJECTIVE:  HPI  Breast cancer: Pt followed with Dr. Yuliana Mcdonald in 2021 and is scheduled for MRI in July. Continues on Brirem. She notes that her last mammogram was normal. Pt reports that oncologist put her on Prolia to protect her bones while complying with Brirem. Hypertension ROS: taking medications as instructed, no medication side effects noted, no TIA's, no chest pain on exertion, no dyspnea on exertion, no swelling of ankles. Pt reports monitoring BP at home at reports values at goal.     Colitis: Pt reports that she is starting a flare. She notes that she is going to contact GI. She states that she usually complies with prednisone during flares. Denies rectal bleeding. Confirms urgency. Hyperlipidemia:  Cardiovascular risk analysis - 65 y.o. female LDL goal is under 130. ROS: taking medications as instructed, no medication side effects noted, no TIA's, no chest pain on exertion, no dyspnea on exertion, no swelling of ankles.  Tolerating meds, no myalgias or other side effects noted. Pt reports that she is UTD with both doses of COVID vaccine. She notes that she had COVID in December. Review of Systems   Gastrointestinal:        Bowel urgency    All other systems reviewed and are negative. Patient-Reported Vitals 12/24/2020   Patient-Reported Weight 149lb   Patient-Reported Height -   Patient-Reported Temperature 98.1   Patient-Reported Systolic  -   Patient-Reported Diastolic -       Physical Exam  Constitutional:       Appearance: Normal appearance. HENT:      Head: Normocephalic and atraumatic. Nose: Nose normal.      Mouth/Throat:      Mouth: Mucous membranes are moist.   Eyes:      Extraocular Movements: Extraocular movements intact. Neck:      Musculoskeletal: Normal range of motion. Pulmonary:      Effort: Pulmonary effort is normal.   Musculoskeletal: Normal range of motion. Neurological:      General: No focal deficit present. Mental Status: She is oriented to person, place, and time. Psychiatric:         Mood and Affect: Mood normal.         Behavior: Behavior normal.         Thought Content: Thought content normal.         Judgment: Judgment normal.             On this date 03/25/2021 I have spent 30 minutes reviewing previous notes, test results and face to face (virtual) with the patient discussing the diagnosis and importance of compliance with the treatment plan as well as documenting on the day of the visit. Kaur Knight, was evaluated through a synchronous (real-time) audio-video encounter. The patient (or guardian if applicable) is aware that this is a billable service. Verbal consent to proceed has been obtained within the past 12 months. The visit was conducted pursuant to the emergency declaration under the [address] [place] [address] and the [place] goviral and [employer] General Act.   Patient identification was verified, and a caregiver was present when appropriate. The patient was located in a state where the provider was credentialed to provide care. Lab results and schedule of future lab studies reviewed with patient. Reviewed diet, exercise and weight control. Written by Williams Tony, as dictated by Lenton Lefort, MD.     Current diagnosis and concerns discussed with pt at length. Understands risks and benefits or current treatment plan and medications and accepts the treatment and medication with any possible risks. Pt asks appropriate questions which were answered. Pt instructed to call with any concerns or problems.

## 2021-03-25 NOTE — PROGRESS NOTES
This is the Subsequent Medicare Annual Wellness Exam, performed 12 months or more after the Initial AWV or the last Subsequent AWV    I have reviewed the patient's medical history in detail and updated the computerized patient record. Depression Risk Factor Screening:     3 most recent PHQ Screens 8/25/2020   Little interest or pleasure in doing things Not at all   Feeling down, depressed, irritable, or hopeless Not at all   Total Score PHQ 2 0       Alcohol Risk Screen    Do you average more than 1 drink per night or more than 7 drinks a week:  No    On any one occasion in the past three months have you have had more than 3 drinks containing alcohol:  Yes        Functional Ability and Level of Safety:    Hearing: Hearing is good. Activities of Daily Living: The home contains: no safety equipment. Patient does total self care      Ambulation: with no difficulty     Fall Risk:  Fall Risk Assessment, last 12 mths 8/25/2020   Able to walk? Yes   Fall in past 12 months? No   Number of falls in past 12 months -   Fall with injury? -      Abuse Screen:  Patient is not abused       Cognitive Screening    Has your family/caregiver stated any concerns about your memory: no     Cognitive Screening: Normal - MMSE (Mini Mental Status Exam)    Assessment/Plan   Education and counseling provided:  Are appropriate based on today's review and evaluation    Diagnoses and all orders for this visit:    1. Essential hypertension    2. Hyperlipidemia, unspecified hyperlipidemia type    3.  Malignant neoplasm of female breast, unspecified estrogen receptor status, unspecified laterality, unspecified site of breast [facility])        Health Maintenance Due     Health Maintenance Due   Topic Date Due    COVID-19 Vaccine (1) Never done    Shingrix Vaccine Age 50> (1 of 2) Never done    Pneumococcal 65+ years (1 of 1 - PPSV23) Never done    Lipid Screen  08/26/2020    Medicare Yearly Exam  08/27/2020    Flu Vaccine (1) Never done  Breast Cancer Screen Mammogram  10/31/2020       Patient Care Team   Patient Care Team:  Jayne Cervantes MD as PCP - Shamar Olivier MD as PCP - [facility]  Nannette Vivar MD (Gastroenterology)  Lorenzo Rader MD as Consulting Provider (Cardiology)  Lorenzo Rader MD (Cardiology)    History     Patient Active Problem List   Diagnosis Code    Breast cancer [facility]) C50.919    Hyperlipidemia E78.5    Ulcerative colitis ([facility] [address] K51.90    Mild carotid artery disease ([address] I77.9    Essential hypertension I10    ACP (advance care planning) Z71.89     Past Medical History:   Diagnosis Date    [place] coronary artery calcium score less than 100     CT Heart scan  - CAC score 4    Cancer ([facility] [address] Left    breast, s/p lumpectomy    Dyslipidemia     Labs 14 - chol 284, , HDL 73, , Apo B 146, LDL-P 2626, CRP 10    HTN (hypertension)     Malignant melanoma nos Left    arm    Mild carotid artery disease ([facility] [address]     Nausea & vomiting     Nausea only    Ulcerative colitis       Past Surgical History:   Procedure Laterality Date    COLONOSCOPY N/A 2016    COLONOSCOPY performed by Conor Palacio MD at [address] [place] [address], COLON, DIAGNOSTIC       polyp    HX BREAST LUMPECTOMY Left     lumpectomy     HX  SECTION       Current Outpatient Medications   Medication Sig Dispense Refill    amLODIPine (NORVASC) 10 mg tablet TAKE 1 TABLET BY MOUTH DAILY 10 Tab 0    lisinopril-hydroCHLOROthiazide (PRINZIDE, ZESTORETIC) 20-25 mg per tablet TAKE 1 TABLET BY MOUTH EVERY DAY 10 Tab 0    letrozole (FEMARA) 2.5 mg tablet TK 1 T PO QD      latanoprost (XALATAN) 0.005 % ophthalmic solution INSTILL 1 DROP IN BOTH EYES QHS      rosuvastatin (CRESTOR) 10 mg tablet Take 1 Tab by mouth nightly. 90 Tab 3    meclizine (ANTIVERT) 25 mg tablet Take 1 Tab by mouth three (3) times daily as needed for Dizziness.  30 Tab 3    cholecalciferol (VITAMIN D3) 400 unit tab tablet Take 600 Units by mouth daily.  COLAZAL PO Take 6 Tabs by mouth daily.        Allergies   Allergen Reactions    Erythromycin Hives       Family History   Problem Relation Age of Onset    Cancer Mother         lung    Asthma Father         emphysema    Cancer Brother         melanoma    Cancer Brother         thyroid     Social History     Tobacco Use    Smoking status: Never Smoker    Smokeless tobacco: Never Used   Substance Use Topics    Alcohol use: No     Alcohol/week: 0.0 standard drinks

## 2021-03-31 ENCOUNTER — HOSPITAL ENCOUNTER (OUTPATIENT)
Dept: LAB | Age: 71
Discharge: HOME | End: 2021-03-31
Payer: MEDICARE

## 2021-03-31 PROCEDURE — 84443 ASSAY THYROID STIM HORMONE: CPT

## 2021-03-31 PROCEDURE — 83704 LIPOPROTEIN BLD QUAN PART: CPT

## 2021-03-31 PROCEDURE — 85027 COMPLETE CBC AUTOMATED: CPT

## 2021-03-31 PROCEDURE — 80053 COMPREHEN METABOLIC PANEL: CPT

## 2021-03-31 PROCEDURE — 36415 COLL VENOUS BLD VENIPUNCTURE: CPT

## 2021-04-01 LAB
ALBUMIN SERPL-MCNC: 4.6 G/DL (ref 3.7–4.7)
ALBUMIN/GLOB SERPL: 1.8 {RATIO} (ref 1.2–2.2)
ALP SERPL-CCNC: 63 IU/L (ref 39–117)
ALT SERPL-CCNC: 11 IU/L (ref 0–32)
AST SERPL-CCNC: 18 IU/L (ref 0–40)
BILIRUB SERPL-MCNC: 0.3 MG/DL (ref 0–1.2)
BUN SERPL-MCNC: 20 MG/DL (ref 8–27)
BUN/CREAT SERPL: 24 (ref 12–28)
CALCIUM SERPL-MCNC: 9.9 MG/DL (ref 8.7–10.3)
CHLORIDE SERPL-SCNC: 100 MMOL/L (ref 96–106)
CHOLEST SERPL-MCNC: 212 MG/DL (ref 100–199)
CO2 SERPL-SCNC: 23 MMOL/L (ref 20–29)
CREAT SERPL-MCNC: 0.84 MG/DL (ref 0.57–1)
ERYTHROCYTE [DISTWIDTH] IN BLOOD BY AUTOMATED COUNT: 13.1 % (ref 11.7–15.4)
GLOBULIN SER CALC-MCNC: 2.5 G/DL (ref 1.5–4.5)
GLUCOSE SERPL-MCNC: 76 MG/DL (ref 65–99)
HCT VFR BLD AUTO: 45.6 % (ref 34–46.6)
HDL SERPL-SCNC: 46.6 UMOL/L
HDLC SERPL-MCNC: 102 MG/DL
HGB BLD-MCNC: 15.2 G/DL (ref 11.1–15.9)
IMP & REVIEW OF LAB RESULTS: NORMAL
LDL SERPL QN: 21.2 NM
LDL SERPL-SCNC: 775 NMOL/L
LDL SMALL SERPL-SCNC: <90 NMOL/L
LDLC SERPL CALC-MCNC: 87 MG/DL (ref 0–99)
LP-IR SCORE SERPL: 25
MCH RBC QN AUTO: 28.3 PG (ref 26.6–33)
MCHC RBC AUTO-ENTMCNC: 33.3 G/DL (ref 31.5–35.7)
MCV RBC AUTO: 85 FL (ref 79–97)
PLATELET # BLD AUTO: 442 X10E3/UL (ref 150–450)
POTASSIUM SERPL-SCNC: 4.2 MMOL/L (ref 3.5–5.2)
PROT SERPL-MCNC: 7.1 G/DL (ref 6–8.5)
RBC # BLD AUTO: 5.37 X10E6/UL (ref 3.77–5.28)
SODIUM SERPL-SCNC: 142 MMOL/L (ref 134–144)
TRIGL SERPL-MCNC: 141 MG/DL (ref 0–149)
TSH SERPL DL<=0.005 MIU/L-ACNC: 3.52 UIU/ML (ref 0.45–4.5)
WBC # BLD AUTO: 13.3 X10E3/UL (ref 3.4–10.8)

## 2021-04-02 ENCOUNTER — TELEPHONE (OUTPATIENT)
Dept: INTERNAL MEDICINE CLINIC | Age: 71
End: 2021-04-02

## 2021-04-02 NOTE — TELEPHONE ENCOUNTER
----- Message from Digerati sent at 4/2/2021 12:47 PM EDT -----  Regarding: MD Kanchan/Telephone  General Message/Vendor Calls    Caller's first and last name: Self      Reason for call: Possible UTI, requesting Rx. Callback required yes/no and why: Yes      Best contact number(s): [phone]      Details to clarify the request: Pt attempting to have RX written for UTI. Made appt for Tuesday 04/06.       Digerati

## 2021-04-02 NOTE — TELEPHONE ENCOUNTER
Patient is calling to follow up , aware her PCP and nurse have left for the day , asking covering nurse and provider to review request, states she [facility] t [facility] at 430 pm , PSr advised its best to discuss UTI with [facility] to receive script today , states she will if our office refuses to send in script. Please advise.

## 2021-04-15 RX ORDER — PREDNISONE 10 MG/1
TABLET ORAL
Qty: 30 TAB | Refills: 0 | Status: SHIPPED | OUTPATIENT
Start: 2021-04-15 | End: 2021-07-12 | Stop reason: SDUPTHER

## 2021-07-12 ENCOUNTER — OFFICE VISIT (OUTPATIENT)
Dept: INTERNAL MEDICINE CLINIC | Age: 71
End: 2021-07-12
Payer: MEDICARE

## 2021-07-12 VITALS
BODY MASS INDEX: 26.84 KG/M2 | RESPIRATION RATE: 18 BRPM | SYSTOLIC BLOOD PRESSURE: 133 MMHG | HEIGHT: 64 IN | WEIGHT: 157.2 LBS | DIASTOLIC BLOOD PRESSURE: 73 MMHG | OXYGEN SATURATION: 98 % | TEMPERATURE: 97.9 F | HEART RATE: 75 BPM

## 2021-07-12 DIAGNOSIS — K51.211 ULCERATIVE PROCTITIS WITH RECTAL BLEEDING (HCC): ICD-10-CM

## 2021-07-12 DIAGNOSIS — G44.89 OTHER HEADACHE SYNDROME: ICD-10-CM

## 2021-07-12 DIAGNOSIS — C50.919 MALIGNANT NEOPLASM OF FEMALE BREAST, UNSPECIFIED ESTROGEN RECEPTOR STATUS, UNSPECIFIED LATERALITY, UNSPECIFIED SITE OF BREAST (HCC): Primary | ICD-10-CM

## 2021-07-12 DIAGNOSIS — I10 ESSENTIAL HYPERTENSION: ICD-10-CM

## 2021-07-12 DIAGNOSIS — I77.9 MILD CAROTID ARTERY DISEASE (HCC): ICD-10-CM

## 2021-07-12 DIAGNOSIS — Z23 ENCOUNTER FOR IMMUNIZATION: ICD-10-CM

## 2021-07-12 PROCEDURE — G8536 NO DOC ELDER MAL SCRN: HCPCS | Performed by: INTERNAL MEDICINE

## 2021-07-12 PROCEDURE — G8754 DIAS BP LESS 90: HCPCS | Performed by: INTERNAL MEDICINE

## 2021-07-12 PROCEDURE — G8752 SYS BP LESS 140: HCPCS | Performed by: INTERNAL MEDICINE

## 2021-07-12 PROCEDURE — G9899 SCRN MAM PERF RSLTS DOC: HCPCS | Performed by: INTERNAL MEDICINE

## 2021-07-12 PROCEDURE — G8427 DOCREV CUR MEDS BY ELIG CLIN: HCPCS | Performed by: INTERNAL MEDICINE

## 2021-07-12 PROCEDURE — 1101F PT FALLS ASSESS-DOCD LE1/YR: CPT | Performed by: INTERNAL MEDICINE

## 2021-07-12 PROCEDURE — G8419 CALC BMI OUT NRM PARAM NOF/U: HCPCS | Performed by: INTERNAL MEDICINE

## 2021-07-12 PROCEDURE — 1090F PRES/ABSN URINE INCON ASSESS: CPT | Performed by: INTERNAL MEDICINE

## 2021-07-12 PROCEDURE — G8510 SCR DEP NEG, NO PLAN REQD: HCPCS | Performed by: INTERNAL MEDICINE

## 2021-07-12 PROCEDURE — G8399 PT W/DXA RESULTS DOCUMENT: HCPCS | Performed by: INTERNAL MEDICINE

## 2021-07-12 PROCEDURE — 3017F COLORECTAL CA SCREEN DOC REV: CPT | Performed by: INTERNAL MEDICINE

## 2021-07-12 PROCEDURE — 90732 PPSV23 VACC 2 YRS+ SUBQ/IM: CPT | Performed by: INTERNAL MEDICINE

## 2021-07-12 PROCEDURE — G0463 HOSPITAL OUTPT CLINIC VISIT: HCPCS | Performed by: INTERNAL MEDICINE

## 2021-07-12 PROCEDURE — 99214 OFFICE O/P EST MOD 30 MIN: CPT | Performed by: INTERNAL MEDICINE

## 2021-07-12 RX ORDER — PREDNISONE 10 MG/1
TABLET ORAL
Qty: 30 TABLET | Refills: 0 | Status: SHIPPED | OUTPATIENT
Start: 2021-07-12 | End: 2021-08-03

## 2021-07-12 NOTE — PROGRESS NOTES
Pavithra Huertas (: 1950) is a 66 y.o. female, established patient, here for evaluation of the following chief complaint(s):  Headache and Blood Pressure Check       ASSESSMENT/PLAN:  Below is the assessment and plan developed based on review of pertinent history, physical exam, labs, studies, and medications. 1. Malignant neoplasm of female breast, unspecified estrogen receptor status, unspecified laterality, unspecified site of breast ([address]  Stable and well managed by oncology. Continue with ongoing regimen of Letrozole. 2. Mild carotid artery disease ([address]  Stable and well-managed. Continue with ongoing regimen of Crestor. 3. Other headache syndrome  -     predniSONE (DELTASONE) 10 mg tablet; TAKE 4 TABLETS BY MOUTH EVERY DAY FOR 3 DAYS, 3 TABLETS EVERY DAY FOR 3 DAYS, 2 TABLETS EVERY DAY FOR 3 DAYS, 1 TABLET EVERY DAY FOR 3 DAYS, Normal, Disp-30 Tablet, R-0  I will rx the pt a prednisone taper since she is satisfied with the relief that it provides to her headaches. I advised the pt to f/u if this regimen is not successful and I will rx a preventative medication. 4. Essential hypertension  BP is at goal. I do not recommend any change in medications. Continue with ongoing regimen of amlodipine and lisinopril-HCTZ. 5. Ulcerative proctitis with rectal bleeding (HCC)  Stable and well-managed by GI. Continue with regimen of prednisone PRN. 6. Encounter for immunization  -     PNEUMOCOCCAL POLYSACCHARIDE VACCINE, 23-VALENT, ADULT OR IMMUNOSUPPRESSED PT DOSE,  I ordered the pneumonia immunization to be administered today. No follow-ups on file. SUBJECTIVE/OBJECTIVE:  HPI     [place]: Pt presents with c/o of HA stemming from her neck and spreading up her head. She notes that aspirin and tylenol provide slight relief, but prednisone was the strongest alleviating factor. Pt notes that the sx started 10 days ago. Pt endorses pain with movement of neck.      Ulcerative Colitis: Pt reports that she had a strong flare up about a month ago that was not relieved by prednisone, but by budesonide. Hypertension ROS: taking medications as instructed, no medication side effects noted, no TIA's, no chest pain on exertion, no dyspnea on exertion, no swelling of ankles. New concerns: BP in office today is 133/73. Hx of Breast Cancer: Pt states that she has a f/u MRI of her breast and then an apt with Dr. Sofi Fan next week. She had a mammogram in the [facility] that came back normal.     Carotid Artery Disease:  Cardiovascular risk analysis - 66 y.o. female LDL goal is under 130. ROS: taking medications as instructed, no medication side effects noted, no TIA's, no chest pain on exertion, no dyspnea on exertion, no swelling of ankles. Tolerating meds, no myalgias or other side effects noted. New concerns: Pt's last LDL was 87 on 3/25/21. Osteoporosis: Pt reports that she has a DEXA scan in 2 weeks as ordered by oncology. Review of Systems   Neurological: Positive for headaches. All other systems reviewed and are negative. Physical Exam  Constitutional:       Appearance: Normal appearance. HENT:      Right Ear: Tympanic membrane and external ear normal.      Left Ear: Tympanic membrane and external ear normal.      Mouth/Throat:      Mouth: Mucous membranes are moist.      Pharynx: Oropharynx is clear. Cardiovascular:      Rate and Rhythm: Normal rate and regular rhythm. Pulses: Normal pulses. Heart sounds: Normal heart sounds. Pulmonary:      Effort: Pulmonary effort is normal.      Breath sounds: Normal breath sounds. Musculoskeletal:         General: Normal range of motion. Skin:     General: Skin is warm and dry. Neurological:      General: No focal deficit present. Mental Status: She is alert and oriented to person, place, and time.    Psychiatric:         Mood and Affect: Mood normal.         Behavior: Behavior normal.           On this date 07/12/2021 I have spent 35 minutes reviewing previous notes, test results and face to face with the patient discussing the diagnosis and importance of compliance with the treatment plan as well as documenting on the day of the visit. An electronic signature was used to authenticate this note. Written by Melly Mccormick as dictated by Dr. Kirsten Crouch.    -- Melly Mccormick

## 2021-08-03 ENCOUNTER — TELEPHONE (OUTPATIENT)
Dept: INTERNAL MEDICINE CLINIC | Age: 71
End: 2021-08-03

## 2021-08-03 DIAGNOSIS — G44.89 OTHER HEADACHE SYNDROME: ICD-10-CM

## 2021-08-03 RX ORDER — TOPIRAMATE 25 MG/1
TABLET ORAL
Qty: 90 TABLET | Refills: 1 | Status: SHIPPED | OUTPATIENT
Start: 2021-08-03 | End: 2021-08-31

## 2021-08-03 RX ORDER — TOPIRAMATE 25 MG/1
25 TABLET ORAL
Qty: 30 TABLET | Refills: 1 | Status: SHIPPED | OUTPATIENT
Start: 2021-08-03 | End: 2021-08-03

## 2021-08-03 RX ORDER — PREDNISONE 10 MG/1
TABLET ORAL
Qty: 30 TABLET | Refills: 0 | Status: SHIPPED | OUTPATIENT
Start: 2021-08-03 | End: 2021-09-01 | Stop reason: ALTCHOICE

## 2021-08-03 NOTE — TELEPHONE ENCOUNTER
Spoke with patient and advised her that Dr. Jace Cutler has sent in a rx for Topamax. She is to start taking it once a day and if it doesn't seem to be helping after 1-1.5 weeks she can increase to twice daily. Appt scheduled for one month med check. Pt understood and was thankful for the call.

## 2021-08-03 NOTE — TELEPHONE ENCOUNTER
Patient called in to state prednisone is not working for her headaches. She was told by the doctor to call in if it does not work as there are other medications she wrote down in her appointment notes for her to try. Please call patient and advise.

## 2021-08-03 NOTE — TELEPHONE ENCOUNTER
Want her to start at topamax at bedtime- we can increase it two times a day if once a day is not working and follow up with me a in a month

## 2021-08-19 ENCOUNTER — TELEPHONE (OUTPATIENT)
Dept: INTERNAL MEDICINE CLINIC | Age: 71
End: 2021-08-19

## 2021-08-19 NOTE — TELEPHONE ENCOUNTER
Patient is requesting to speak with the nurse  to discuss concerns with getting covid booster, she can be reached at [phone]

## 2021-08-19 NOTE — TELEPHONE ENCOUNTER
Left v/m that patient does not fit into the category of immunocompromised as per the CDC guidelines. However, she can get her third booster after 9/20/21 and 8 months after her second vaccine. Patient is to return call if she has any further questions.

## 2021-08-31 ENCOUNTER — OFFICE VISIT (OUTPATIENT)
Dept: CARDIOLOGY CLINIC | Age: 71
End: 2021-08-31
Payer: MEDICARE

## 2021-08-31 VITALS
WEIGHT: 156 LBS | DIASTOLIC BLOOD PRESSURE: 72 MMHG | BODY MASS INDEX: 26.63 KG/M2 | HEART RATE: 84 BPM | SYSTOLIC BLOOD PRESSURE: 138 MMHG | HEIGHT: 64 IN | OXYGEN SATURATION: 94 %

## 2021-08-31 DIAGNOSIS — I10 ESSENTIAL HYPERTENSION: Primary | ICD-10-CM

## 2021-08-31 DIAGNOSIS — E78.5 HYPERLIPIDEMIA, UNSPECIFIED HYPERLIPIDEMIA TYPE: ICD-10-CM

## 2021-08-31 PROCEDURE — 1090F PRES/ABSN URINE INCON ASSESS: CPT | Performed by: SPECIALIST

## 2021-08-31 PROCEDURE — G8752 SYS BP LESS 140: HCPCS | Performed by: SPECIALIST

## 2021-08-31 PROCEDURE — G8427 DOCREV CUR MEDS BY ELIG CLIN: HCPCS | Performed by: SPECIALIST

## 2021-08-31 PROCEDURE — 99214 OFFICE O/P EST MOD 30 MIN: CPT | Performed by: SPECIALIST

## 2021-08-31 PROCEDURE — G9899 SCRN MAM PERF RSLTS DOC: HCPCS | Performed by: SPECIALIST

## 2021-08-31 PROCEDURE — 1101F PT FALLS ASSESS-DOCD LE1/YR: CPT | Performed by: SPECIALIST

## 2021-08-31 PROCEDURE — G8754 DIAS BP LESS 90: HCPCS | Performed by: SPECIALIST

## 2021-08-31 PROCEDURE — G8432 DEP SCR NOT DOC, RNG: HCPCS | Performed by: SPECIALIST

## 2021-08-31 PROCEDURE — G8536 NO DOC ELDER MAL SCRN: HCPCS | Performed by: SPECIALIST

## 2021-08-31 PROCEDURE — 93010 ELECTROCARDIOGRAM REPORT: CPT | Performed by: SPECIALIST

## 2021-08-31 PROCEDURE — G8399 PT W/DXA RESULTS DOCUMENT: HCPCS | Performed by: SPECIALIST

## 2021-08-31 PROCEDURE — 3017F COLORECTAL CA SCREEN DOC REV: CPT | Performed by: SPECIALIST

## 2021-08-31 PROCEDURE — G0463 HOSPITAL OUTPT CLINIC VISIT: HCPCS | Performed by: SPECIALIST

## 2021-08-31 PROCEDURE — 93005 ELECTROCARDIOGRAM TRACING: CPT | Performed by: SPECIALIST

## 2021-08-31 PROCEDURE — G8419 CALC BMI OUT NRM PARAM NOF/U: HCPCS | Performed by: SPECIALIST

## 2021-08-31 NOTE — PROGRESS NOTES
Pete Velez MD. [facility]              Patient: Citlalli Goncalves  : 1950      Today's Date: 2021            HISTORY OF PRESENT ILLNESS:     History of Present Illness:  No cardiac complaints. No CP or SOB. Just some colitis issues - better now. She feels well. PAST MEDICAL HISTORY:     Past Medical History:   Diagnosis Date    [facility]     [facility] Heart scan  - CAC score 4    Cancer ([address] Left    breast, s/p lumpectomy    Dyslipidemia     Labs 14 - chol 284, , HDL 73, , Apo B 146, LDL-P 2626, CRP 10    HTN (hypertension)     Malignant melanoma nos Left    arm    Mild carotid artery disease (HCC)     Nausea & vomiting     Nausea only    Ulcerative colitis          Past Surgical History:   Procedure Laterality Date    COLONOSCOPY N/A 2016    COLONOSCOPY performed by Sarah Beth Dan MD at [address] [facility] ENDOSCOPY, COLON, DIAGNOSTIC       polyp    HX BREAST LUMPECTOMY Left     lumpectomy     HX  SECTION             MEDICATIONS:     Current Outpatient Medications   Medication Sig Dispense Refill    predniSONE (DELTASONE) 10 mg tablet TAKE 4 TABLETS BY MOUTH DAILY FOR 3 DAYS, 3 TABLETS DAILY FOR 3 DAYS, 2 TABLETS FOR 3 DAYS, 1 TABLET FOR 3 DAYS 30 Tablet 0    amLODIPine (NORVASC) 10 mg tablet TAKE 1 TABLET BY MOUTH DAILY 90 Tab 1    lisinopril-hydroCHLOROthiazide (PRINZIDE, ZESTORETIC) 20-25 mg per tablet TAKE 1 TABLET BY MOUTH EVERY DAY 90 Tab 1    letrozole (FEMARA) 2.5 mg tablet TK 1 T PO QD      latanoprost (XALATAN) 0.005 % ophthalmic solution INSTILL 1 DROP IN BOTH EYES QHS      rosuvastatin (CRESTOR) 10 mg tablet Take 1 Tab by mouth nightly. 90 Tab 3    meclizine (ANTIVERT) 25 mg tablet Take 1 Tab by mouth three (3) times daily as needed for Dizziness. 30 Tab 3    cholecalciferol (VITAMIN D3) 400 unit tab tablet Take 600 Units by mouth daily.       COLAZAL PO Take 6 Tabs by mouth daily.      topiramate (TOPAMAX) 25 mg tablet TAKE 1 TABLET BY MOUTH EVERY NIGHT (Patient not taking: Reported on 8/31/2021) 90 Tablet 1       Allergies   Allergen Reactions    Erythromycin Hives           SOCIAL HISTORY:     Social History     Tobacco Use    Smoking status: Never Smoker    Smokeless tobacco: Never Used   Substance Use Topics    Alcohol use: No     Alcohol/week: 0.0 standard drinks    Drug use: No         FAMILY HISTORY:     Family History   Problem Relation Age of Onset    Cancer Mother         lung    Asthma Father         emphysema    Cancer Brother         melanoma    Cancer Brother         thyroid             SOCIAL HISTORY:     Social History     Tobacco Use    Smoking status: Never Smoker    Smokeless tobacco: Never Used   Substance Use Topics    Alcohol use: No     Alcohol/week: 0.0 standard drinks    Drug use: No              REVIEW OF SYSTEMS:       Review of Systems:    Constitutional: Negative for fever, chills    HEENT: Negative for vision changes.    Respiratory: Negative for cough    Cardiovascular: Negative for orthopnea, syncope, and PND.    Gastrointestinal: Negative for melena    Genitourinary: Negative for dysuria    Musculoskeletal: Negative for myalgias.    Skin: Negative for rash    Heme: No problems bleeding.    Neurological: Negative for speech change and focal weakness.                    PHYSICAL EXAM:      Physical Exam:  Visit Vitals  /72 (BP 1 Location: Left upper arm, BP Patient Position: Sitting, BP Cuff Size: Adult)   Pulse 84   Ht 5' 4\" (1.626 m)   Wt 156 lb (70.8 kg)   SpO2 94%   BMI 26.78 kg/m²          Patient appears generally well, mood and affect are appropriate and pleasant. HEENT:  Hearing intact, non-icteric, normocephalic, atraumatic. Neck Exam: Supple, No JVD   Lung Exam: Clear to auscultation, even breath sounds.    Cardiac Exam: Regular rate and JRCRDL IEYZ 7/0 systolic murmur (radiating to carotids)  Abdomen: Soft, non-tender, normal bowel sounds. No bruits or masses. Extremities: Moves all ext well. No lower extremity edema. Psych: Appropriate affect  Neuro - Grossly intact                   LABS / OTHER STUDIES:       .lastcmmp  Lab Results   Component Value Date/Time    WBC 13.3 (H) 03/31/2021 08:54 AM    HGB 15.2 03/31/2021 08:54 AM    HCT 45.6 03/31/2021 08:54 AM    PLATELET 305 41/29/3468 08:54 AM    MCV 85 03/31/2021 08:54 AM       Lab Results   Component Value Date/Time    Cholesterol, total 193 06/13/2018 08:40 AM    Cholesterol, Total 212 (H) 03/31/2021 08:54 AM    HDL Cholesterol 81 06/13/2018 08:40 AM    LDL, calculated 80 06/13/2018 08:40 AM    VLDL, calculated 32 06/13/2018 08:40 AM    Triglyceride 159 (H) 06/13/2018 08:40 AM    CHOL/HDL Ratio 4.3 08/13/2010 06:54 PM     Lab Results   Component Value Date/Time    Sodium 142 03/31/2021 08:54 AM    Potassium 4.2 03/31/2021 08:54 AM    Chloride 100 03/31/2021 08:54 AM    CO2 23 03/31/2021 08:54 AM    Anion gap 18 04/12/2016 08:00 AM    Glucose 76 03/31/2021 08:54 AM    BUN 20 03/31/2021 08:54 AM    Creatinine 0.84 03/31/2021 08:54 AM    BUN/Creatinine ratio 24 03/31/2021 08:54 AM    GFR est AA 80 03/31/2021 08:54 AM    GFR est non-AA 70 03/31/2021 08:54 AM    Calcium 9.9 03/31/2021 08:54 AM    Bilirubin, total 0.3 03/31/2021 08:54 AM    Alk.  phosphatase 63 03/31/2021 08:54 AM    Protein, total 7.1 03/31/2021 08:54 AM    Albumin 4.6 03/31/2021 08:54 AM    Globulin 3.3 08/13/2010 06:54 PM    A-G Ratio 1.8 03/31/2021 08:54 AM    ALT (SGPT) 11 03/31/2021 08:54 AM    AST (SGOT) 18 03/31/2021 08:54 AM     Lab Results   Component Value Date/Time    TSH 3.520 03/31/2021 08:54 AM     Component      Latest Ref Rng & Units 3/31/2021           8:54 AM   LDL-P      <1,000 nmol/L 775   LDL-C(NIH CALC)      0 - 99 mg/dL 87   HDL-C      >39 mg/dL 102   Triglycerides      0 - 149 mg/dL 141   Cholesterol, total      100 - 199 mg/dL 212 (H)   HDL-P (Total)      >=30.5 umol/L 46.6   Small LDL-P      <=527 nmol/L <90   LDL size      >20.5 nm 21.2   LP-IR SCORE      <=45 25            CARDIAC DIAGNOSTICS:       Cardiac Evaluation Includes:    CT Heart Scan 7/11 - CAC score 6    CT Heart scan 8/14 - CAC score 4  Carotid Dopplers 11/5/14 - 0-9% JEREMIAH and 62-07% LICA stenosis    Stress Echo 11/5/14 - walked 5 min. Normal stress EKG and stress Echo. Additional echo findings showed normal RV size and function, normal AV, MV, and TV. Trace MR and TR. PA pressure was 40 mmHg at rest and 45 mmHg after peak exercise. Echo 12/18/16 - LVEF 55-60%, AV thickening.   Atrial septal aneurysm. Treadmill Stress Test 1/5/17 - walked 4:04 (7.0 METS), normal study   Carotid Dopplers 7/6/17 - 0-9% JEREMIAH and 58-92% LICA stenosis               EKG 9/14 (reviewed by me) - NSR, Non-specific ST changes    EKG 4/28/16 - NSR, normal   EKG 7/17/18 - NSR, non- non-specific T wave abn   EKG 8/20/19 - NSR, non-specific   EKG 8/25/20 - NSR, normal    EKG 8/31/21 - NSR, normal            ASSESSMENT AND PLAN:       Assessment and Plan:    1) History of atypical chest pain    - Very low CAC score (4) in 2014  - Stress test 1/17 was normal.   - No complaints of chest pain recently       2) HTN  - BP looks good - continue meds       3) Dyslipidemia -    - Labs 11/21/14 - chol 284, , HDL 73, , Apo B 146, LDL-P 2626, CRP 10  - Ms. F did initially not want to take a statin despite high lipids for years. Fortunately her CAC score has been low.  Her [place] risk score gave her 8.2% 10 year CV risk.    - After much discussion, she agreed to take a statin and is tolerating it.     - Continue Crestor at 10 mg daily -- on a statin her lipids look great  - Will recheck lipids   - encouraged healthy diet and exercise       4) Slight murmur due to AV thickening     5) See me back in one year.  Patient expressed understanding of the plan - questions were answered.      On a side note, she has 2 kids. Chencho Toro is from [place].  She works for a . Went to [employer] 2018.  Did [employer] baseball tour summer 2019.               Homero Mendes MD, [facility] [address] [place], [address]      [address] [facility]  [address]  [facility], [address] [place] [address]  Ph: [phone]                               [phone]

## 2021-08-31 NOTE — PROGRESS NOTES
Elmo Parents is a 66 y.o. female    Visit Vitals  /72 (BP 1 Location: Left upper arm, BP Patient Position: Sitting, BP Cuff Size: Adult)   Pulse 84   Ht 5' 4\" (1.626 m)   Wt 156 lb (70.8 kg)   SpO2 94%   BMI 26.78 kg/m²       Chief Complaint   Patient presents with    Hypertension    Cholesterol Problem    Other     CS       Chest pain NO  SOB NO  Dizziness NO  Swelling NO  Recent hospital visit NO  Refills NO  COVID VACCINE STATUS YES

## 2021-08-31 NOTE — PROGRESS NOTES
Shazia Hahn (: 1950) is a 66 y.o. female, established patient, here for evaluation of the following chief complaint(s):  Medication Evaluation (topiramate)       ASSESSMENT/PLAN:  Below is the assessment and plan developed based on review of pertinent history, physical exam, labs, studies, and medications. 1. Other headache syndrome  Stable and well-managed. Continue with ongoing regimen of CBD cream. I encouraged her to return if she begins to suffer from HA again and recommended OTC Voltaren Gel to be use with CBD. Will continue to monitor for improvements or changes. 2. Ulcerative proctitis with rectal bleeding (HCC)  Stable. I encouraged the pt to reschedule her colonoscopy. 3. Hyperlipidemia, unspecified hyperlipidemia type  Stable, patient is tolerating medications, no myalgias. I do not recommend any change in medications. Continue with ongoing regimen of Crestor. 4. Essential hypertension  BP is at goal. I do not recommend any change in medications. Continue with ongoing regimen of Lisinopril-HCTZ and amlodipine. No follow-ups on file. SUBJECTIVE/OBJECTIVE:  HPI    HA: Pt notes GI sx and a change in taste as side effects to Topamax, so she is no longer taking it. Of note, pt never took the prednisone that she was previously rx for HA. She states that her HA are better, but she believes that it is due to the use of CBD cream. Pt is satisfied with the relief that she has experienced with CBD and would like to see if this continues to help before adding another regimen. Ulcerative Colitis: Pt states that she is doing well and denies bleeding. She cancelled her colonoscopy because she does not want to be in a hospital at this time. Pt plans to reschedule after her booster shot. Hypertension ROS: taking medications as instructed, no medication side effects noted, no TIA's, no chest pain on exertion, no dyspnea on exertion, no swelling of ankles.  BP in office today is 113/69. Pt had an apt with cardiology yesterday (Dr. Reji Lester, 9/1/21) and received a good report. Review of Systems   All other systems reviewed and are negative. Physical Exam  Constitutional:       Appearance: Normal appearance. HENT:      Right Ear: Tympanic membrane and external ear normal.      Left Ear: Tympanic membrane and external ear normal.      Mouth/Throat:      Mouth: Mucous membranes are moist.      Pharynx: Oropharynx is clear. Cardiovascular:      Rate and Rhythm: Normal rate and regular rhythm. Pulses: Normal pulses. Heart sounds: Normal heart sounds. Pulmonary:      Effort: Pulmonary effort is normal.      Breath sounds: Normal breath sounds. Musculoskeletal:         General: Normal range of motion. Skin:     General: Skin is warm and dry. Neurological:      General: No focal deficit present. Mental Status: She is alert and oriented to person, place, and time. Psychiatric:         Mood and Affect: Mood normal.         Behavior: Behavior normal.       On this date 09/01/2021 I have spent 30 minutes reviewing previous notes, test results and face to face with the patient discussing the diagnosis and importance of compliance with the treatment plan as well as documenting on the day of the visit. An electronic signature was used to authenticate this note. Written by Alex Pena as dictated by Dr. Violet Maloney.    -- Alex Pena

## 2021-09-01 ENCOUNTER — OFFICE VISIT (OUTPATIENT)
Dept: INTERNAL MEDICINE CLINIC | Age: 71
End: 2021-09-01
Payer: MEDICARE

## 2021-09-01 VITALS
OXYGEN SATURATION: 97 % | TEMPERATURE: 98 F | RESPIRATION RATE: 16 BRPM | SYSTOLIC BLOOD PRESSURE: 113 MMHG | WEIGHT: 158.4 LBS | HEIGHT: 64 IN | DIASTOLIC BLOOD PRESSURE: 69 MMHG | BODY MASS INDEX: 27.04 KG/M2 | HEART RATE: 89 BPM

## 2021-09-01 DIAGNOSIS — I10 ESSENTIAL HYPERTENSION: ICD-10-CM

## 2021-09-01 DIAGNOSIS — G44.89 OTHER HEADACHE SYNDROME: Primary | ICD-10-CM

## 2021-09-01 DIAGNOSIS — E78.5 HYPERLIPIDEMIA, UNSPECIFIED HYPERLIPIDEMIA TYPE: ICD-10-CM

## 2021-09-01 DIAGNOSIS — K51.211 ULCERATIVE PROCTITIS WITH RECTAL BLEEDING (HCC): ICD-10-CM

## 2021-09-01 PROCEDURE — G8536 NO DOC ELDER MAL SCRN: HCPCS | Performed by: INTERNAL MEDICINE

## 2021-09-01 PROCEDURE — 3017F COLORECTAL CA SCREEN DOC REV: CPT | Performed by: INTERNAL MEDICINE

## 2021-09-01 PROCEDURE — G8399 PT W/DXA RESULTS DOCUMENT: HCPCS | Performed by: INTERNAL MEDICINE

## 2021-09-01 PROCEDURE — 99214 OFFICE O/P EST MOD 30 MIN: CPT | Performed by: INTERNAL MEDICINE

## 2021-09-01 PROCEDURE — G8752 SYS BP LESS 140: HCPCS | Performed by: INTERNAL MEDICINE

## 2021-09-01 PROCEDURE — G8419 CALC BMI OUT NRM PARAM NOF/U: HCPCS | Performed by: INTERNAL MEDICINE

## 2021-09-01 PROCEDURE — G0463 HOSPITAL OUTPT CLINIC VISIT: HCPCS | Performed by: INTERNAL MEDICINE

## 2021-09-01 PROCEDURE — G9899 SCRN MAM PERF RSLTS DOC: HCPCS | Performed by: INTERNAL MEDICINE

## 2021-09-01 PROCEDURE — G8432 DEP SCR NOT DOC, RNG: HCPCS | Performed by: INTERNAL MEDICINE

## 2021-09-01 PROCEDURE — G8754 DIAS BP LESS 90: HCPCS | Performed by: INTERNAL MEDICINE

## 2021-09-01 PROCEDURE — 1090F PRES/ABSN URINE INCON ASSESS: CPT | Performed by: INTERNAL MEDICINE

## 2021-09-01 PROCEDURE — 1101F PT FALLS ASSESS-DOCD LE1/YR: CPT | Performed by: INTERNAL MEDICINE

## 2021-09-01 PROCEDURE — G8427 DOCREV CUR MEDS BY ELIG CLIN: HCPCS | Performed by: INTERNAL MEDICINE

## 2021-09-19 DIAGNOSIS — G44.89 OTHER HEADACHE SYNDROME: ICD-10-CM

## 2021-09-19 RX ORDER — PREDNISONE 10 MG/1
TABLET ORAL
Qty: 30 TABLET | Refills: 0 | Status: SHIPPED | OUTPATIENT
Start: 2021-09-19 | End: 2021-11-10

## 2021-09-22 DIAGNOSIS — E78.5 HYPERLIPIDEMIA, UNSPECIFIED HYPERLIPIDEMIA TYPE: ICD-10-CM

## 2021-09-22 RX ORDER — LISINOPRIL AND HYDROCHLOROTHIAZIDE 20; 25 MG/1; MG/1
TABLET ORAL
Qty: 90 TABLET | Refills: 1 | Status: SHIPPED | OUTPATIENT
Start: 2021-09-22 | End: 2021-12-28

## 2021-09-22 RX ORDER — ROSUVASTATIN CALCIUM 10 MG/1
TABLET, COATED ORAL
Qty: 90 TABLET | Refills: 3 | Status: SHIPPED | OUTPATIENT
Start: 2021-09-22 | End: 2022-08-08

## 2021-09-22 RX ORDER — AMLODIPINE BESYLATE 10 MG/1
TABLET ORAL
Qty: 90 TABLET | Refills: 1 | Status: SHIPPED | OUTPATIENT
Start: 2021-09-22 | End: 2021-12-28

## 2021-11-10 DIAGNOSIS — G44.89 OTHER HEADACHE SYNDROME: ICD-10-CM

## 2021-11-10 RX ORDER — PREDNISONE 10 MG/1
TABLET ORAL
Qty: 30 TABLET | Refills: 0 | Status: SHIPPED | OUTPATIENT
Start: 2021-11-10 | End: 2021-12-16

## 2021-12-16 DIAGNOSIS — G44.89 OTHER HEADACHE SYNDROME: ICD-10-CM

## 2021-12-16 RX ORDER — PREDNISONE 10 MG/1
TABLET ORAL
Qty: 30 TABLET | Refills: 0 | Status: SHIPPED | OUTPATIENT
Start: 2021-12-16 | End: 2022-01-17

## 2021-12-28 DIAGNOSIS — E78.5 HYPERLIPIDEMIA, UNSPECIFIED HYPERLIPIDEMIA TYPE: ICD-10-CM

## 2021-12-28 RX ORDER — LISINOPRIL AND HYDROCHLOROTHIAZIDE 20; 25 MG/1; MG/1
TABLET ORAL
Qty: 90 TABLET | Refills: 1 | Status: SHIPPED | OUTPATIENT
Start: 2021-12-28 | End: 2022-06-29

## 2021-12-28 RX ORDER — AMLODIPINE BESYLATE 10 MG/1
TABLET ORAL
Qty: 90 TABLET | Refills: 1 | Status: SHIPPED | OUTPATIENT
Start: 2021-12-28 | End: 2022-06-29

## 2022-01-17 DIAGNOSIS — G44.89 OTHER HEADACHE SYNDROME: ICD-10-CM

## 2022-01-17 RX ORDER — PREDNISONE 10 MG/1
TABLET ORAL
Qty: 30 TABLET | Refills: 0 | Status: SHIPPED | OUTPATIENT
Start: 2022-01-17 | End: 2022-03-26

## 2022-03-26 DIAGNOSIS — G44.89 OTHER HEADACHE SYNDROME: ICD-10-CM

## 2022-03-26 RX ORDER — PREDNISONE 10 MG/1
TABLET ORAL
Qty: 30 TABLET | Refills: 0 | Status: SHIPPED | OUTPATIENT
Start: 2022-03-26 | End: 2022-06-11

## 2022-04-14 DIAGNOSIS — R42 VERTIGO: ICD-10-CM

## 2022-04-14 RX ORDER — MECLIZINE HYDROCHLORIDE 25 MG/1
25 TABLET ORAL
Qty: 30 TABLET | Refills: 0 | Status: SHIPPED | OUTPATIENT
Start: 2022-04-14 | End: 2022-06-20

## 2022-06-11 DIAGNOSIS — G44.89 OTHER HEADACHE SYNDROME: ICD-10-CM

## 2022-06-11 RX ORDER — PREDNISONE 10 MG/1
TABLET ORAL
Qty: 30 TABLET | Refills: 0 | Status: SHIPPED | OUTPATIENT
Start: 2022-06-11 | End: 2022-08-27

## 2022-06-20 DIAGNOSIS — R42 VERTIGO: ICD-10-CM

## 2022-06-20 RX ORDER — MECLIZINE HYDROCHLORIDE 25 MG/1
TABLET ORAL
Qty: 30 TABLET | Refills: 0 | Status: SHIPPED | OUTPATIENT
Start: 2022-06-20 | End: 2022-07-19

## 2022-07-19 DIAGNOSIS — R42 VERTIGO: ICD-10-CM

## 2022-07-19 RX ORDER — MECLIZINE HYDROCHLORIDE 25 MG/1
TABLET ORAL
Qty: 30 TABLET | Refills: 0 | Status: SHIPPED | OUTPATIENT
Start: 2022-07-19 | End: 2022-10-18

## 2022-08-04 NOTE — PATIENT INSTRUCTIONS
Medicare Wellness Visit, Female     The best way to live healthy is to have a lifestyle where you eat a well-balanced diet, exercise regularly, limit alcohol use, and quit all forms of tobacco/nicotine, if applicable. Regular preventive services are another way to keep healthy. Preventive services (vaccines, screening tests, monitoring & exams) can help personalize your care plan, which helps you manage your own care. Screening tests can find health problems at the earliest stages, when they are easiest to treat. [facility] follows the current, evidence-based guidelines published by the [facility] Dhruv Peterson ([facility]) when recommending preventive services for our patients. Because we follow these guidelines, sometimes recommendations change over time as research supports it. (For example, mammograms used to be recommended annually. Even though Medicare will still pay for an annual mammogram, the newer guidelines recommend a mammogram every two years for women of average risk). Of course, you and your doctor may decide to screen more often for some diseases, based on your risk and your co-morbidities (chronic disease you are already diagnosed with). Preventive services for you include:  - Medicare offers their members a free annual wellness visit, which is time for you and your primary care provider to discuss and plan for your preventive service needs. Take advantage of this benefit every year!  -All adults over the age of 72 should receive the recommended pneumonia vaccines. Current USPSTF guidelines recommend a series of two vaccines for the best pneumonia protection.   -All adults should have a flu vaccine yearly and a tetanus vaccine every 10 years.   -All adults age 48 and older should receive the shingles vaccines (series of two vaccines).       -All adults age 38-68 who are overweight should have a diabetes screening test once every three years.   -All adults born between [birth year] and 1965 should be screened once for Hepatitis C.  -Other screening tests and preventive services for persons with diabetes include: an eye exam to screen for diabetic retinopathy, a kidney function test, a foot exam, and stricter control over your cholesterol.   -Cardiovascular screening for adults with routine risk involves an electrocardiogram (ECG) at intervals determined by your doctor.   -Colorectal cancer screenings should be done for adults age 54-65 with no increased risk factors for colorectal cancer. There are a number of acceptable methods of screening for this type of cancer. Each test has its own benefits and drawbacks. Discuss with your doctor what is most appropriate for you during your annual wellness visit. The different tests include: colonoscopy (considered the best screening method), a fecal occult blood test, a fecal DNA test, and sigmoidoscopy.    -A bone mass density test is recommended when a woman turns 65 to screen for osteoporosis. This test is only recommended one time, as a screening. Some providers will use this same test as a disease monitoring tool if you already have osteoporosis. -Breast cancer screenings are recommended every other year for women of normal risk, age 54-69.  -Cervical cancer screenings for women over age 72 are only recommended with certain risk factors.      Here is a list of your current Health Maintenance items (your personalized list of preventive services) with a due date:  Health Maintenance Due   Topic Date Due    Shingles Vaccine (1 of 2) Never done    Mammogram  10/31/2020    COVID-19 Vaccine (3 - Booster for [place] series) 07/27/2021    Annual Well Visit  03/26/2022    Cholesterol Test   03/31/2022    Pneumococcal Vaccine (2 - PCV) 07/12/2022    Depresssion Screening  07/12/2022

## 2022-08-04 NOTE — PROGRESS NOTES
Leslye Spain (: 1950) is a 74 y.o. female, established patient, here for evaluation of the following chief complaint(s):  Dizziness (Multiple vertigo episodes)       ASSESSMENT/PLAN:  Below is the assessment and plan developed based on review of pertinent history, physical exam, labs, studies, and medications. 1. Vertigo-this is persistent and not getting better so we need to check an MRI to rule out other causes like ischemic event. I gave a prescription for Klonopin to be used at night for just 7-10 nights but if it does not help to stop it. I gave a prescription for physical therapy to treat the vertigo to go to [place] at [address] [facility]; Future  -     clonazePAM (KlonoPIN) 0.5 mg tablet; Take 1 Tablet by mouth nightly as needed for Anxiety. Max Daily Amount: 0.5 mg., Normal, Disp-10 Tablet, R-0  2. Ulcerative proctitis with rectal bleeding (HCC)-been flaring and not at goal.  We need to check a hemoglobin and she needs to get in with GI and have another colonoscopy  -     CBC W/O DIFF; Future  3. Mild carotid artery disease (HCC)-last ultrasound was stable continue with Lucy statin 10 mg once a day  4. Malignant neoplasm of female breast, unspecified estrogen receptor status, unspecified laterality, unspecified site of breast (HCC)-MRI was clear continue with letrozole 2.5 mg once a day  5. Essential hypertension-at goal but closely monitor continue lisinopril 20/25 every day  6. Mixed hyperlipidemia-have not had labs in over a year so this is presumed stable but unsure we will recheck labs and continue with Lucy statin 10 mg for now  -     METABOLIC PANEL, COMPREHENSIVE; Future  -     NMR LIPOPROFILE WITH LIPIDS (WITHOUT GRAPH); Future  7. Medicare annual wellness visit, subsequent    No follow-ups on file. SUBJECTIVE/OBJECTIVE:  HPI    Subjective:   Leslye Spain is a 74 y.o. female with hypertension.     Hypertension ROS: taking medications as instructed, no medication side effects noted, no TIA's, no chest pain on exertion, no dyspnea on exertion, no swelling of ankles. New concerns: 119/73. Ulcerative colitis-she has  NOT been doing well and denies any bleeding. She had terrible flare beginning July and I sent in prednisone taper and needs to see GI- has not had colonoscopy in awhile   She has been having some vertigo and normally it was for day or two but now constant for the last couple of weeks -she has ringing in the right here and has been having issues with her balance walking and driving    Subjective:   Zina Nixon is a 74 y.o. female with hyperlipidemia. Cardiovascular risk analysis - 74 y.o. female LDL goal is under 100. ROS: taking medications as instructed, no medication side effects noted, no TIA's, no chest pain on exertion, no dyspnea on exertion, no swelling of ankles. Tolerating meds, no myalgias or other side effects noted  New concerns: tolerating medicine . Breast cancer-she continues to take her Femara 2.5 mg once a day; she only had an MRI of her breast     Review of Systems   All other systems reviewed and are negative. Physical Exam  Vitals and nursing note reviewed. Constitutional:       Appearance: She is well-developed. HENT:      Head: Normocephalic and atraumatic. Right Ear: External ear normal.      Left Ear: External ear normal.      Nose: Nose normal.   Eyes:      Conjunctiva/sclera: Conjunctivae normal.   Neck:      Thyroid: No thyroid mass or thyromegaly. Vascular: No carotid bruit. Cardiovascular:      Rate and Rhythm: Normal rate and regular rhythm. Heart sounds: Normal heart sounds. Pulmonary:      Effort: Pulmonary effort is normal.      Breath sounds: Normal breath sounds. Abdominal:      General: Bowel sounds are normal.      Palpations: Abdomen is soft. Musculoskeletal:         General: Normal range of motion. Cervical back: Normal range of motion and neck supple. Skin:     General: Skin is warm and dry. Findings: No abrasion or rash. Neurological:      Mental Status: She is alert and oriented to person, place, and time. Cranial Nerves: No cranial nerve deficit. Sensory: No sensory deficit. Coordination: Coordination normal.   Psychiatric:         Behavior: Behavior normal.         Thought Content: Thought content normal.         Judgment: Judgment normal.       On this date 08/05/2022 I have spent 35  minutes outside wellness visit  reviewing previous notes, test results and face to face with the patient discussing the diagnosis and importance of compliance with the treatment plan as well as documenting on the day of the visit. An electronic signature was used to authenticate this note. -- Gela Jung MD   This is the Subsequent Medicare Annual Wellness Exam, performed 12 months or more after the Initial AWV or the last Subsequent AWV    I have reviewed the patient's medical history in detail and updated the computerized patient record. Assessment/Plan   Education and counseling provided:  Are appropriate based on today's review and evaluation      Medicare annual wellness visit, subsequent       Depression Risk Factor Screening     3 most recent PHQ Screens 8/5/2022   Little interest or pleasure in doing things Not at all   Feeling down, depressed, irritable, or hopeless Not at all   Total Score PHQ 2 0       Alcohol & Drug Abuse Risk Screen    Do you average more than 1 drink per night or more than 7 drinks a week:  No    On any one occasion in the past three months have you have had more than 3 drinks containing alcohol:  No          Functional Ability and Level of Safety    Hearing: Hearing is good. Activities of Daily Living: The home contains: no safety equipment. Patient does total self care      Ambulation: with no difficulty     Fall Risk:  Fall Risk Assessment, last 12 mths 8/5/2022   Able to walk?  Yes   Fall in past 12 months? 0   Do you feel unsteady? 0   Are you worried about falling 0   Number of falls in past 12 months -   Fall with injury?  -      Abuse Screen:  Patient is not abused       Cognitive Screening    Has your family/caregiver stated any concerns about your memory: no     Cognitive Screening: Normal - MMSE (Mini Mental Status Exam)    Health Maintenance Due     Health Maintenance Due   Topic Date Due    Shingrix Vaccine Age 49> (1 of 2) Never done    Breast Cancer Screen Mammogram  10/31/2020    Medicare Yearly Exam  03/26/2022    Lipid Screen  03/31/2022    Pneumococcal 65+ years (2 - PCV) 07/12/2022    Depression Screen  07/12/2022       Patient Care Team   Patient Care Team:  Ester Tyler MD as PCP - Sourav Pinedo MD as PCP - [facility] Empaneled Provider  Nicholas Jacobsen MD (Gastroenterology)  Thao Campos MD as Consulting Provider (Cardiovascular Disease Physician)  Thao Campos MD (Cardiovascular Disease Physician)    History     Patient Active Problem List   Diagnosis Code    Breast cancer [facility]) C50.919    Hyperlipidemia E78.5    Ulcerative colitis ([facility] [address] K51.90    Mild carotid artery disease ([address] I77.9    Essential hypertension I10    ACP (advance care planning) Z71.89     Past Medical History:   Diagnosis Date    [place] coronary artery calcium score less than 100     CT Heart scan 8/14 - CAC score 4    Cancer ([facility] [address] Left    breast, s/p lumpectomy    Dyslipidemia     Labs 11/21/14 - chol 284, , HDL 73, , Apo B 146, LDL-P 2626, CRP 10    History of right mastectomy     HTN (hypertension)     Malignant melanoma nos Left    arm    Mild carotid artery disease (HCC)     Nausea & vomiting     Nausea only    Ulcerative colitis       Past Surgical History:   Procedure Laterality Date    COLONOSCOPY N/A 06/16/2016    COLONOSCOPY performed by Julita Lowe MD at [facility], COLON, DIAGNOSTIC      2009 polyp    HX BREAST LUMPECTOMY Left     lumpectomy HX  SECTION      HX MASTECTOMY Right 2019     Current Outpatient Medications   Medication Sig Dispense Refill    clonazePAM (KlonoPIN) 0.5 mg tablet Take 1 Tablet by mouth nightly as needed for Anxiety. Max Daily Amount: 0.5 mg. 10 Tablet 0    meclizine (ANTIVERT) 25 mg tablet TAKE 1 TABLET BY MOUTH THREE TIMES DAILY AS NEEDED FOR DIZZINESS 30 Tablet 0    amLODIPine (NORVASC) 10 mg tablet TAKE 1 TABLET BY MOUTH DAILY 30 Tablet 0    lisinopril-hydroCHLOROthiazide (PRINZIDE, ZESTORETIC) 20-25 mg per tablet TAKE 1 TABLET BY MOUTH EVERY DAY 30 Tablet 0    predniSONE (DELTASONE) 10 mg tablet TAKE 4 TABLETS BY MOUTH DAILY FOR 3 DAYS, 3 TABLETS DAILY FOR 3 DAYS, 2 TABLETS DAILY FOR 3 DAYS, THEN 1 TABLET DAILY FOR 3 DAYS 30 Tablet 0    rosuvastatin (CRESTOR) 10 mg tablet TAKE 1 TABLET BY MOUTH EVERY NIGHT 90 Tablet 3    letrozole (FEMARA) 2.5 mg tablet TK 1 T PO QD      latanoprost (XALATAN) 0.005 % ophthalmic solution INSTILL 1 DROP IN BOTH EYES QHS      cholecalciferol (VITAMIN D3) 400 unit tab tablet Take 600 Units by mouth daily. COLAZAL PO Take 6 Tabs by mouth daily.        Allergies   Allergen Reactions    Erythromycin Hives       Family History   Problem Relation Age of Onset    Cancer Mother         lung    Asthma Father         emphysema    Cancer Brother         melanoma    Cancer Brother         thyroid     Social History     Tobacco Use    Smoking status: Never    Smokeless tobacco: Never   Substance Use Topics    Alcohol use: No     Alcohol/week: 0.0 standard drinks         Divya Hermosillo MD

## 2022-08-05 ENCOUNTER — OFFICE VISIT (OUTPATIENT)
Dept: INTERNAL MEDICINE CLINIC | Age: 72
End: 2022-08-05
Payer: MEDICARE

## 2022-08-05 VITALS
WEIGHT: 158.6 LBS | TEMPERATURE: 98.2 F | OXYGEN SATURATION: 97 % | SYSTOLIC BLOOD PRESSURE: 119 MMHG | HEIGHT: 64 IN | RESPIRATION RATE: 16 BRPM | HEART RATE: 94 BPM | DIASTOLIC BLOOD PRESSURE: 73 MMHG | BODY MASS INDEX: 27.08 KG/M2

## 2022-08-05 DIAGNOSIS — C50.919 MALIGNANT NEOPLASM OF FEMALE BREAST, UNSPECIFIED ESTROGEN RECEPTOR STATUS, UNSPECIFIED LATERALITY, UNSPECIFIED SITE OF BREAST (HCC): ICD-10-CM

## 2022-08-05 DIAGNOSIS — R42 VERTIGO: ICD-10-CM

## 2022-08-05 DIAGNOSIS — I77.9 MILD CAROTID ARTERY DISEASE (HCC): ICD-10-CM

## 2022-08-05 DIAGNOSIS — Z00.00 MEDICARE ANNUAL WELLNESS VISIT, SUBSEQUENT: Primary | ICD-10-CM

## 2022-08-05 DIAGNOSIS — E78.2 MIXED HYPERLIPIDEMIA: ICD-10-CM

## 2022-08-05 DIAGNOSIS — K51.211 ULCERATIVE PROCTITIS WITH RECTAL BLEEDING (HCC): ICD-10-CM

## 2022-08-05 DIAGNOSIS — I10 ESSENTIAL HYPERTENSION: ICD-10-CM

## 2022-08-05 PROCEDURE — G8427 DOCREV CUR MEDS BY ELIG CLIN: HCPCS | Performed by: INTERNAL MEDICINE

## 2022-08-05 PROCEDURE — G8752 SYS BP LESS 140: HCPCS | Performed by: INTERNAL MEDICINE

## 2022-08-05 PROCEDURE — G8510 SCR DEP NEG, NO PLAN REQD: HCPCS | Performed by: INTERNAL MEDICINE

## 2022-08-05 PROCEDURE — G8417 CALC BMI ABV UP PARAM F/U: HCPCS | Performed by: INTERNAL MEDICINE

## 2022-08-05 PROCEDURE — G0463 HOSPITAL OUTPT CLINIC VISIT: HCPCS | Performed by: INTERNAL MEDICINE

## 2022-08-05 PROCEDURE — 1090F PRES/ABSN URINE INCON ASSESS: CPT | Performed by: INTERNAL MEDICINE

## 2022-08-05 PROCEDURE — G0439 PPPS, SUBSEQ VISIT: HCPCS | Performed by: INTERNAL MEDICINE

## 2022-08-05 PROCEDURE — 3017F COLORECTAL CA SCREEN DOC REV: CPT | Performed by: INTERNAL MEDICINE

## 2022-08-05 PROCEDURE — G8754 DIAS BP LESS 90: HCPCS | Performed by: INTERNAL MEDICINE

## 2022-08-05 PROCEDURE — G8399 PT W/DXA RESULTS DOCUMENT: HCPCS | Performed by: INTERNAL MEDICINE

## 2022-08-05 PROCEDURE — G8536 NO DOC ELDER MAL SCRN: HCPCS | Performed by: INTERNAL MEDICINE

## 2022-08-05 PROCEDURE — 99214 OFFICE O/P EST MOD 30 MIN: CPT | Performed by: INTERNAL MEDICINE

## 2022-08-05 PROCEDURE — 1101F PT FALLS ASSESS-DOCD LE1/YR: CPT | Performed by: INTERNAL MEDICINE

## 2022-08-05 RX ORDER — CLONAZEPAM 0.5 MG/1
0.5 TABLET ORAL
Qty: 10 TABLET | Refills: 0 | Status: SHIPPED | OUTPATIENT
Start: 2022-08-05 | End: 2022-10-19 | Stop reason: ALTCHOICE

## 2022-08-06 LAB
ALBUMIN SERPL-MCNC: 4.5 G/DL (ref 3.5–5)
ALBUMIN/GLOB SERPL: 1.2 {RATIO} (ref 1.1–2.2)
ALP SERPL-CCNC: 66 U/L (ref 45–117)
ALT SERPL-CCNC: 21 U/L (ref 12–78)
ANION GAP SERPL CALC-SCNC: 11 MMOL/L (ref 5–15)
AST SERPL-CCNC: 21 U/L (ref 15–37)
BILIRUB SERPL-MCNC: 0.3 MG/DL (ref 0.2–1)
BUN SERPL-MCNC: 17 MG/DL (ref 6–20)
BUN/CREAT SERPL: 19 (ref 12–20)
CALCIUM SERPL-MCNC: 10.1 MG/DL (ref 8.5–10.1)
CHLORIDE SERPL-SCNC: 104 MMOL/L (ref 97–108)
CO2 SERPL-SCNC: 23 MMOL/L (ref 21–32)
CREAT SERPL-MCNC: 0.88 MG/DL (ref 0.55–1.02)
ERYTHROCYTE [DISTWIDTH] IN BLOOD BY AUTOMATED COUNT: 13.8 % (ref 11.5–14.5)
GLOBULIN SER CALC-MCNC: 3.7 G/DL (ref 2–4)
GLUCOSE SERPL-MCNC: 102 MG/DL (ref 65–100)
HCT VFR BLD AUTO: 46.4 % (ref 35–47)
HGB BLD-MCNC: 15.1 G/DL (ref 11.5–16)
MCH RBC QN AUTO: 28.5 PG (ref 26–34)
MCHC RBC AUTO-ENTMCNC: 32.5 G/DL (ref 30–36.5)
MCV RBC AUTO: 87.7 FL (ref 80–99)
NRBC # BLD: 0 K/UL (ref 0–0.01)
NRBC BLD-RTO: 0 PER 100 WBC
PLATELET # BLD AUTO: 424 K/UL (ref 150–400)
PMV BLD AUTO: 11.2 FL (ref 8.9–12.9)
POTASSIUM SERPL-SCNC: 3.6 MMOL/L (ref 3.5–5.1)
PROT SERPL-MCNC: 8.2 G/DL (ref 6.4–8.2)
RBC # BLD AUTO: 5.29 M/UL (ref 3.8–5.2)
SODIUM SERPL-SCNC: 138 MMOL/L (ref 136–145)
WBC # BLD AUTO: 12.1 K/UL (ref 3.6–11)

## 2022-08-07 LAB
CHOLEST SERPL-MCNC: 202 MG/DL (ref 100–199)
HDL SERPL-SCNC: 47.8 UMOL/L
HDLC SERPL-MCNC: 84 MG/DL
LDL SERPL QN: 20.8 NM
LDL SERPL-SCNC: 1338 NMOL/L
LDL SMALL SERPL-SCNC: 609 NMOL/L
LDLC SERPL CALC-MCNC: 94 MG/DL (ref 0–99)
LP-IR SCORE SERPL: 42
TRIGL SERPL-MCNC: 141 MG/DL (ref 0–149)

## 2022-08-07 NOTE — PROGRESS NOTES
Please let her know I sent a letter home on other labs but lipids have jumped up from last year- can we increase the rouvastatin to 20 mg?

## 2022-08-08 ENCOUNTER — TELEPHONE (OUTPATIENT)
Dept: INTERNAL MEDICINE CLINIC | Age: 72
End: 2022-08-08

## 2022-08-08 RX ORDER — ROSUVASTATIN CALCIUM 10 MG/1
20 TABLET, COATED ORAL
Qty: 90 TABLET | Refills: 3
Start: 2022-08-08 | End: 2022-09-17

## 2022-08-08 NOTE — TELEPHONE ENCOUNTER
Spoke with patient and relayed lab results. Patient states that she is taking her rosuvastatin nightly. Advised her to increase to two nightly and let us know when she is running low and we can send in the new dose as long as she is tolerating this dose without side effects. Pt understood and was thankful for the call.

## 2022-08-08 NOTE — TELEPHONE ENCOUNTER
----- Message from Negra Villa MD sent at 8/7/2022 12:46 PM EDT -----  Please let her know I sent a letter home on other labs but lipids have jumped up from last year- can we increase the rouvastatin to 20 mg?

## 2022-08-26 DIAGNOSIS — G44.89 OTHER HEADACHE SYNDROME: ICD-10-CM

## 2022-08-27 RX ORDER — PREDNISONE 10 MG/1
TABLET ORAL
Qty: 30 TABLET | Refills: 0 | Status: SHIPPED | OUTPATIENT
Start: 2022-08-27

## 2022-08-29 ENCOUNTER — OFFICE VISIT (OUTPATIENT)
Dept: CARDIOLOGY CLINIC | Age: 72
End: 2022-08-29
Payer: MEDICARE

## 2022-08-29 VITALS
HEIGHT: 66 IN | WEIGHT: 157 LBS | BODY MASS INDEX: 25.23 KG/M2 | HEART RATE: 77 BPM | DIASTOLIC BLOOD PRESSURE: 70 MMHG | OXYGEN SATURATION: 96 % | SYSTOLIC BLOOD PRESSURE: 126 MMHG

## 2022-08-29 DIAGNOSIS — I10 ESSENTIAL HYPERTENSION: Primary | ICD-10-CM

## 2022-08-29 DIAGNOSIS — E78.5 DYSLIPIDEMIA: ICD-10-CM

## 2022-08-29 PROCEDURE — G8427 DOCREV CUR MEDS BY ELIG CLIN: HCPCS | Performed by: SPECIALIST

## 2022-08-29 PROCEDURE — G0463 HOSPITAL OUTPT CLINIC VISIT: HCPCS | Performed by: SPECIALIST

## 2022-08-29 PROCEDURE — G8752 SYS BP LESS 140: HCPCS | Performed by: SPECIALIST

## 2022-08-29 PROCEDURE — 1090F PRES/ABSN URINE INCON ASSESS: CPT | Performed by: SPECIALIST

## 2022-08-29 PROCEDURE — 93005 ELECTROCARDIOGRAM TRACING: CPT | Performed by: SPECIALIST

## 2022-08-29 PROCEDURE — G8399 PT W/DXA RESULTS DOCUMENT: HCPCS | Performed by: SPECIALIST

## 2022-08-29 PROCEDURE — 1101F PT FALLS ASSESS-DOCD LE1/YR: CPT | Performed by: SPECIALIST

## 2022-08-29 PROCEDURE — 93010 ELECTROCARDIOGRAM REPORT: CPT | Performed by: SPECIALIST

## 2022-08-29 PROCEDURE — G8754 DIAS BP LESS 90: HCPCS | Performed by: SPECIALIST

## 2022-08-29 PROCEDURE — 1123F ACP DISCUSS/DSCN MKR DOCD: CPT | Performed by: SPECIALIST

## 2022-08-29 PROCEDURE — G8536 NO DOC ELDER MAL SCRN: HCPCS | Performed by: SPECIALIST

## 2022-08-29 PROCEDURE — 99214 OFFICE O/P EST MOD 30 MIN: CPT | Performed by: SPECIALIST

## 2022-08-29 PROCEDURE — 3017F COLORECTAL CA SCREEN DOC REV: CPT | Performed by: SPECIALIST

## 2022-08-29 PROCEDURE — G8417 CALC BMI ABV UP PARAM F/U: HCPCS | Performed by: SPECIALIST

## 2022-08-29 PROCEDURE — G8432 DEP SCR NOT DOC, RNG: HCPCS | Performed by: SPECIALIST

## 2022-08-29 NOTE — PROGRESS NOTES
Barry Streeter MD. [facility]              Patient: Hi Kerr  : 1950      Today's Date: 2022            HISTORY OF PRESENT ILLNESS:     History of Present Illness:  No cardiac complaints. No CP or SOB. Has some vertigo issues. PAST MEDICAL HISTORY:     Past Medical History:   Diagnosis Date    [facility] coronary artery calcium score less than 100     CT Heart scan  - CAC score 4    Cancer ([address] Left    breast, s/p lumpectomy    Dyslipidemia     Labs 14 - chol 284, , HDL 73, , Apo B 146, LDL-P 2626, CRP 10    History of right mastectomy     HTN (hypertension)     Malignant melanoma nos Left    arm    Mild carotid artery disease (HCC)     Nausea & vomiting     Nausea only    Ulcerative colitis          Past Surgical History:   Procedure Laterality Date    COLONOSCOPY N/A 2016    COLONOSCOPY performed by Giovanni Knott MD at [place] [address], COLON, DIAGNOSTIC       polyp    HX BREAST LUMPECTOMY Left     lumpectomy     HX  SECTION      HX MASTECTOMY Right 2019             CURRENT MEDICATIONS:      Current Outpatient Medications   Medication Sig Dispense Refill    predniSONE (DELTASONE) 10 mg tablet TAKE 4 TABLETS BY MOUTH DAILY FOR 3 DAYS, 3 TABLETS DAILY FOR 3 DAYS, 2 TABLETS DAILY FOR 3 DAYS, THEN 1 TABLET DAILY FOR 3 DAYS 30 Tablet 0    rosuvastatin (CRESTOR) 10 mg tablet Take 2 Tablets by mouth nightly.  90 Tablet 3    meclizine (ANTIVERT) 25 mg tablet TAKE 1 TABLET BY MOUTH THREE TIMES DAILY AS NEEDED FOR DIZZINESS 30 Tablet 0    amLODIPine (NORVASC) 10 mg tablet TAKE 1 TABLET BY MOUTH DAILY 30 Tablet 0    lisinopril-hydroCHLOROthiazide (PRINZIDE, ZESTORETIC) 20-25 mg per tablet TAKE 1 TABLET BY MOUTH EVERY DAY 30 Tablet 0    letrozole (FEMARA) 2.5 mg tablet TK 1 T PO QD      latanoprost (XALATAN) 0.005 % ophthalmic solution INSTILL 1 DROP IN BOTH EYES QHS      cholecalciferol (VITAMIN D3) 400 unit tab tablet Take 600 Units by mouth daily. COLAZAL PO Take 6 Tabs by mouth daily. clonazePAM (KlonoPIN) 0.5 mg tablet Take 1 Tablet by mouth nightly as needed for Anxiety. Max Daily Amount: 0.5 mg. (Patient not taking: Reported on 8/29/2022) 10 Tablet 0           Allergies   Allergen Reactions    Erythromycin Hives           SOCIAL HISTORY:     Social History     Tobacco Use    Smoking status: Never    Smokeless tobacco: Never   Substance Use Topics    Alcohol use: No     Alcohol/week: 0.0 standard drinks    Drug use: No         FAMILY HISTORY:     Family History   Problem Relation Age of Onset    Cancer Mother         lung    Asthma Father         emphysema    Cancer Brother         melanoma    Cancer Brother         thyroid             SOCIAL HISTORY:     Social History     Tobacco Use    Smoking status: Never    Smokeless tobacco: Never   Substance Use Topics    Alcohol use: No     Alcohol/week: 0.0 standard drinks    Drug use: No              REVIEW OF SYSTEMS:       Review of Systems:    Constitutional: Negative for fever, chills    HEENT: Negative for vision changes. Respiratory: Negative for cough    Cardiovascular: Negative for orthopnea, syncope, and PND. Gastrointestinal: Negative for melena    Genitourinary: Negative for dysuria    Musculoskeletal: Negative for myalgias. Skin: Negative for rash    Heme: No problems bleeding. Neurological: Negative for speech change and focal weakness. PHYSICAL EXAM:      Physical Exam:  Visit Vitals  /70 (BP 1 Location: Left upper arm, BP Patient Position: Sitting)   Pulse 77   Ht 5' 6\" (1.676 m)   Wt 157 lb (71.2 kg)   SpO2 96%   BMI 25.34 kg/m²          Patient appears generally well, mood and affect are appropriate and pleasant. HEENT:  Hearing intact, non-icteric, normocephalic, atraumatic. Neck Exam: Supple, No JVD   Lung Exam: Clear to auscultation, even breath sounds.    Cardiac Exam: Regular rate and rhythm with 2/6 systolic murmur (radiating to carotids)  Abdomen: Soft, non-tender, normal bowel sounds. No bruits or masses. Extremities: Moves all ext well. No lower extremity edema. Psych: Appropriate affect  Neuro - Grossly intact                   LABS / OTHER STUDIES:       .lastcmmp  Lab Results   Component Value Date/Time    WBC 12.1 (H) 08/05/2022 12:23 PM    HGB 15.1 08/05/2022 12:23 PM    HCT 46.4 08/05/2022 12:23 PM    PLATELET 222 (H) 53/98/9291 12:23 PM    MCV 87.7 08/05/2022 12:23 PM       Lab Results   Component Value Date/Time    Cholesterol, total 193 06/13/2018 08:40 AM    Cholesterol, Total 202 (H) 08/05/2022 12:23 PM    HDL Cholesterol 81 06/13/2018 08:40 AM    LDL, calculated 80 06/13/2018 08:40 AM    VLDL, calculated 32 06/13/2018 08:40 AM    Triglyceride 159 (H) 06/13/2018 08:40 AM    CHOL/HDL Ratio 4.3 08/13/2010 06:54 PM     Lab Results   Component Value Date/Time    Sodium 138 08/05/2022 12:23 PM    Potassium 3.6 08/05/2022 12:23 PM    Chloride 104 08/05/2022 12:23 PM    CO2 23 08/05/2022 12:23 PM    Anion gap 11 08/05/2022 12:23 PM    Glucose 102 (H) 08/05/2022 12:23 PM    BUN 17 08/05/2022 12:23 PM    Creatinine 0.88 08/05/2022 12:23 PM    BUN/Creatinine ratio 19 08/05/2022 12:23 PM    GFR est AA >60 08/05/2022 12:23 PM    GFR est non-AA >60 08/05/2022 12:23 PM    Calcium 10.1 08/05/2022 12:23 PM    Bilirubin, total 0.3 08/05/2022 12:23 PM    Alk.  phosphatase 66 08/05/2022 12:23 PM    Protein, total 8.2 08/05/2022 12:23 PM    Albumin 4.5 08/05/2022 12:23 PM    Globulin 3.7 08/05/2022 12:23 PM    A-G Ratio 1.2 08/05/2022 12:23 PM    ALT (SGPT) 21 08/05/2022 12:23 PM    AST (SGOT) 21 08/05/2022 12:23 PM     Lab Results   Component Value Date/Time    TSH 3.520 03/31/2021 08:54 AM     Component      Latest Ref Rng & Units 8/5/2022          12:23 PM   LDL-P      <1,000 nmol/L 1,338 (H)   LDL-C      0 - 99 mg/dL 94   HDL-C      >39 mg/dL 84   Triglycerides      0 - 149 mg/dL 141   Cholesterol, total      100 - 199 mg/dL 202 (H) HDL-P (Total)      >=30.5 umol/L 47.8   Small LDL-P      <=527 nmol/L 609 (H)   LDL size      >20.5 nm 20.8   LP-IR score      <=45   42           CARDIAC DIAGNOSTICS:       Cardiac Evaluation Includes:    CT Heart Scan 7/11 - CAC score 6    CT Heart scan 8/14 - CAC score 4  Carotid Dopplers 11/5/14 - 0-9% JEREMIAH and 81-09% LICA stenosis    Stress Echo 11/5/14 - walked 5 min. Normal stress EKG and stress Echo. Additional echo findings showed normal RV size and function, normal AV, MV, and TV. Trace MR and TR. PA pressure was 40 mmHg at rest and 45 mmHg after peak exercise. Echo 12/18/16 - LVEF 55-60%, AV thickening. Atrial septal aneurysm. Treadmill Stress Test 1/5/17 - walked 4:04 (7.0 METS), normal study   Carotid Dopplers 7/6/17 - 0-9% JEREMIAH and 88-74% LICA stenosis               EKG 9/14 (reviewed by me) - NSR, Non-specific ST changes    EKG 4/28/16 - NSR, normal   EKG 7/17/18 - NSR, non- non-specific T wave abn   EKG 8/20/19 - NSR, non-specific   EKG 8/25/20 - NSR, normal    EKG 8/31/21 - NSR, normal   EKG 8/29/22 - NSR, normal            ASSESSMENT AND PLAN:       Assessment and Plan:    1) History of atypical chest pain    - Very low CAC score (4) in 2014  - Stress test 1/17 was normal.   - No complaints of chest pain recently       2) HTN  - BP looks good - continue meds       3) Dyslipidemia -    - Labs 11/21/14 - chol 284, , HDL 73, , Apo B 146, LDL-P 2626, CRP 10  - Ms. F did initially not want to take a statin despite high lipids for years. Fortunately her CAC score has been low. Her [place] risk score gave her 8.2% 10 year CV risk. - After much discussion, she agreed to take a statin and is tolerating it. - Continue Crestor at 20 mg daily (recently increased)  - Follow lipids   - encouraged healthy diet and exercise       4) Slight murmur due to AV thickening     5) See me back in one year. Patient expressed understanding of the plan - questions were answered.       On a side note, she has 2 kids. She is from [place]. She works for a . Went to [employer] 2018. Did [place] [place] baseball tour summer 2019. Beck Hong MD, [place]   [address] [facility], [address]      [place] [address]  [address]  [place]  Ph: [phone]                                [phone]

## 2022-08-29 NOTE — PROGRESS NOTES
Chief Complaint   Patient presents with    Follow-up     Annual     Cholesterol Problem    Hypertension     Visit Vitals  /70 (BP 1 Location: Left upper arm, BP Patient Position: Sitting)   Pulse 77   Ht 5' 6\" (1.676 m)   Wt 157 lb (71.2 kg)   SpO2 96%   BMI 25.34 kg/m²     Chest pain denied   SOB denied   Palpitations denied   Swelling in hands/feet denied   Dizziness denied   Recent hospital stays denied   Refills denied

## 2022-08-30 ENCOUNTER — HOSPITAL ENCOUNTER (OUTPATIENT)
Dept: MRI IMAGING | Age: 72
Discharge: HOME | End: 2022-08-30
Attending: INTERNAL MEDICINE
Payer: MEDICARE

## 2022-08-30 DIAGNOSIS — R42 VERTIGO: ICD-10-CM

## 2022-08-30 PROCEDURE — 70553 MRI BRAIN STEM W/O & W/DYE: CPT

## 2022-08-30 PROCEDURE — A9576 INJ PROHANCE MULTIPACK: HCPCS | Performed by: INTERNAL MEDICINE

## 2022-08-30 PROCEDURE — 74011250636 HC RX REV CODE- 250/636: Performed by: INTERNAL MEDICINE

## 2022-08-30 RX ADMIN — GADOTERIDOL 14 ML: 279.3 INJECTION, SOLUTION INTRAVENOUS at 08:51

## 2022-09-16 ENCOUNTER — TELEPHONE (OUTPATIENT)
Dept: INTERNAL MEDICINE CLINIC | Age: 72
End: 2022-09-16

## 2022-09-16 NOTE — TELEPHONE ENCOUNTER
Spoke with patient and she confirmed that she is scheduled to see Dr. Anupam Patten on 10/18/22. She is continuing PT and it is helping a little bit with vertigo.

## 2022-09-16 NOTE — TELEPHONE ENCOUNTER
----- Message from Leonardo Ribeiro MD sent at 9/14/2022  7:29 AM EDT -----  Can you check at some point that 's office did make an appt for her - you can call the patient

## 2022-09-17 RX ORDER — ROSUVASTATIN CALCIUM 10 MG/1
TABLET, COATED ORAL
Qty: 90 TABLET | Refills: 3 | Status: SHIPPED | OUTPATIENT
Start: 2022-09-17

## 2022-10-17 NOTE — PROGRESS NOTES
Andra Hayden (: 1950) is a 74 y.o. female, established patient, here for evaluation of the following chief complaint(s):  Follow-up (Diabetes follow up )       ASSESSMENT/PLAN:  Below is the assessment and plan developed based on review of pertinent history, physical exam, labs, studies, and medications. 1. Essential hypertension- cont amlodipine and , lisinopril/hctz and tolerating medicine   2. Hyperlipidemia, unspecified hyperlipidemia type-cont with crestor and tolerating medicine   3. Ulcerative proctitis with rectal bleeding ([facility] [address]- no longer flaring doing well   4. Malignant neoplasm of female breast, unspecified estrogen receptor status, unspecified laterality, unspecified site of breast (HCC)-continue with letrozole  5. Subarachnoid cyst-saw Dr. Alfred Gerardo, he recommended seeing neurology first and there opinion on whether the cyst is playing a role in her dizziness. If there is not much of a correlation then they will just continue to monitor the size of the cyst and not do any surgery  6. Mauro Najera seems to come and go, physical therapist did not feel like it was due to crystals; cont to treat with dramamine and watch   7. Abnormal fasting glucose- she had a non fasting glucose of 123 and was concerned that she might be a diabetic. I reassured her but we will check A1c  -     HEMOGLOBIN A1C WITH EAG; Future        SUBJECTIVE/OBJECTIVE:  HPI  Subjective:   Andra Hayden is a 74 y.o. female with hypertension. Hypertension ROS: taking medications as instructed, no medication side effects noted, no TIA's, no chest pain on exertion, no dyspnea on exertion, no swelling of ankles. New concerns: 117/74. Subjective:   Andra Hayden is a 74 y.o. female with hyperlipidemia. Cardiovascular risk analysis - 74 y.o. female LDL goal is under 130.    ROS: taking medications as instructed, no medication side effects noted, no TIA's, no chest pain on exertion, no dyspnea on exertion, no swelling of ankles. Tolerating meds, no myalgias or other side effects noted  New concerns: she was also concerned her blood sugar had been high. Lab Results   Component Value Date/Time    Cholesterol, total 193 06/13/2018 08:40 AM    Cholesterol, Total 202 (H) 08/05/2022 12:23 PM    HDL Cholesterol 81 06/13/2018 08:40 AM    LDL, calculated 80 06/13/2018 08:40 AM    VLDL, calculated 32 06/13/2018 08:40 AM    Triglyceride 159 (H) 06/13/2018 08:40 AM    CHOL/HDL Ratio 4.3 08/13/2010 06:54 PM      Recently had a MRI that showed a subarachnoid cyst    IN terms of her vertigo she has been feeling it off and on and comes and goes- sometimes she has to take it every day and sometimes not at all and she did discuss that it is not related to crystals in ear     She had been flaring in terms of her UC and having rectal bleeding- colonoscopy in Feb     Saw oncologist and calcium was high     Review of Systems   All other systems reviewed and are negative. Physical Exam  Vitals and nursing note reviewed. Constitutional:       Appearance: She is well-developed. HENT:      Head: Normocephalic and atraumatic. Right Ear: External ear normal.      Left Ear: External ear normal.      Nose: Nose normal.   Eyes:      Conjunctiva/sclera: Conjunctivae normal.   Neck:      Thyroid: No thyroid mass or thyromegaly. Vascular: No carotid bruit. Cardiovascular:      Rate and Rhythm: Normal rate and regular rhythm. Heart sounds: Normal heart sounds. Pulmonary:      Effort: Pulmonary effort is normal.      Breath sounds: Normal breath sounds. Abdominal:      General: Bowel sounds are normal.      Palpations: Abdomen is soft. Musculoskeletal:         General: Normal range of motion. Cervical back: Normal range of motion and neck supple. Skin:     General: Skin is warm and dry. Findings: No abrasion or rash. Neurological:      Mental Status: She is alert and oriented to person, place, and time. Cranial Nerves: No cranial nerve deficit. Sensory: No sensory deficit. Coordination: Coordination normal.   Psychiatric:         Behavior: Behavior normal.         Thought Content: Thought content normal.         Judgment: Judgment normal.         On this date 10/19/2022 I have spent 30 minutes reviewing previous notes, test results and face to face with the patient discussing the diagnosis and importance of compliance with the treatment plan as well as documenting on the day of the visit. An electronic signature was used to authenticate this note.   -- Natacha Dia MD

## 2022-10-18 DIAGNOSIS — R42 VERTIGO: ICD-10-CM

## 2022-10-18 RX ORDER — MECLIZINE HYDROCHLORIDE 25 MG/1
TABLET ORAL
Qty: 30 TABLET | Refills: 0 | Status: SHIPPED | OUTPATIENT
Start: 2022-10-18

## 2022-10-19 ENCOUNTER — OFFICE VISIT (OUTPATIENT)
Dept: INTERNAL MEDICINE CLINIC | Age: 72
End: 2022-10-19
Payer: MEDICARE

## 2022-10-19 VITALS
DIASTOLIC BLOOD PRESSURE: 74 MMHG | HEIGHT: 66 IN | TEMPERATURE: 97.9 F | BODY MASS INDEX: 25.23 KG/M2 | HEART RATE: 92 BPM | OXYGEN SATURATION: 95 % | RESPIRATION RATE: 14 BRPM | SYSTOLIC BLOOD PRESSURE: 117 MMHG | WEIGHT: 157 LBS

## 2022-10-19 DIAGNOSIS — K51.211 ULCERATIVE PROCTITIS WITH RECTAL BLEEDING (HCC): ICD-10-CM

## 2022-10-19 DIAGNOSIS — G93.0 SUBARACHNOID CYST: ICD-10-CM

## 2022-10-19 DIAGNOSIS — R73.01 ABNORMAL FASTING GLUCOSE: ICD-10-CM

## 2022-10-19 DIAGNOSIS — I10 ESSENTIAL HYPERTENSION: Primary | ICD-10-CM

## 2022-10-19 DIAGNOSIS — E78.5 HYPERLIPIDEMIA, UNSPECIFIED HYPERLIPIDEMIA TYPE: ICD-10-CM

## 2022-10-19 DIAGNOSIS — R42 VERTIGO: ICD-10-CM

## 2022-10-19 DIAGNOSIS — C50.919 MALIGNANT NEOPLASM OF FEMALE BREAST, UNSPECIFIED ESTROGEN RECEPTOR STATUS, UNSPECIFIED LATERALITY, UNSPECIFIED SITE OF BREAST (HCC): ICD-10-CM

## 2022-10-19 LAB
EST. AVERAGE GLUCOSE BLD GHB EST-MCNC: 111 MG/DL
HBA1C MFR BLD: 5.5 % (ref 4–5.6)

## 2022-10-19 PROCEDURE — G8427 DOCREV CUR MEDS BY ELIG CLIN: HCPCS | Performed by: INTERNAL MEDICINE

## 2022-10-19 PROCEDURE — 1101F PT FALLS ASSESS-DOCD LE1/YR: CPT | Performed by: INTERNAL MEDICINE

## 2022-10-19 PROCEDURE — G8754 DIAS BP LESS 90: HCPCS | Performed by: INTERNAL MEDICINE

## 2022-10-19 PROCEDURE — 3017F COLORECTAL CA SCREEN DOC REV: CPT | Performed by: INTERNAL MEDICINE

## 2022-10-19 PROCEDURE — G8417 CALC BMI ABV UP PARAM F/U: HCPCS | Performed by: INTERNAL MEDICINE

## 2022-10-19 PROCEDURE — G8752 SYS BP LESS 140: HCPCS | Performed by: INTERNAL MEDICINE

## 2022-10-19 PROCEDURE — 99214 OFFICE O/P EST MOD 30 MIN: CPT | Performed by: INTERNAL MEDICINE

## 2022-10-19 PROCEDURE — G8536 NO DOC ELDER MAL SCRN: HCPCS | Performed by: INTERNAL MEDICINE

## 2022-10-19 PROCEDURE — 1090F PRES/ABSN URINE INCON ASSESS: CPT | Performed by: INTERNAL MEDICINE

## 2022-10-19 PROCEDURE — G8399 PT W/DXA RESULTS DOCUMENT: HCPCS | Performed by: INTERNAL MEDICINE

## 2022-10-19 PROCEDURE — G0463 HOSPITAL OUTPT CLINIC VISIT: HCPCS | Performed by: INTERNAL MEDICINE

## 2022-10-19 PROCEDURE — G8510 SCR DEP NEG, NO PLAN REQD: HCPCS | Performed by: INTERNAL MEDICINE

## 2022-10-25 ENCOUNTER — TELEPHONE (OUTPATIENT)
Dept: INTERNAL MEDICINE CLINIC | Age: 72
End: 2022-10-25

## 2022-10-25 NOTE — TELEPHONE ENCOUNTER
----- Message from Lisa Manning MD sent at 10/20/2022  6:34 AM EDT -----  Please let her know sugar test normal!

## 2022-12-02 DIAGNOSIS — G44.89 OTHER HEADACHE SYNDROME: ICD-10-CM

## 2022-12-02 RX ORDER — PREDNISONE 10 MG/1
TABLET ORAL
Qty: 30 TABLET | Refills: 0 | Status: SHIPPED | OUTPATIENT
Start: 2022-12-02

## 2023-01-03 ENCOUNTER — TELEPHONE (OUTPATIENT)
Dept: CARDIOLOGY CLINIC | Age: 73
End: 2023-01-03

## 2023-01-03 DIAGNOSIS — R42 VERTIGO: ICD-10-CM

## 2023-01-03 DIAGNOSIS — G44.89 OTHER HEADACHE SYNDROME: ICD-10-CM

## 2023-01-03 RX ORDER — ROSUVASTATIN CALCIUM 20 MG/1
20 TABLET, COATED ORAL
Qty: 90 TABLET | Refills: 2 | Status: SHIPPED | OUTPATIENT
Start: 2023-01-03

## 2023-01-03 RX ORDER — MECLIZINE HYDROCHLORIDE 25 MG/1
TABLET ORAL
Qty: 30 TABLET | Refills: 0 | Status: SHIPPED | OUTPATIENT
Start: 2023-01-03

## 2023-01-03 RX ORDER — PREDNISONE 10 MG/1
TABLET ORAL
Qty: 30 TABLET | Refills: 0 | Status: SHIPPED | OUTPATIENT
Start: 2023-01-03

## 2023-01-03 NOTE — TELEPHONE ENCOUNTER
Pt is rq a New prescription to be written and sent for the up in dosage that  prescribed of 2x nightly for her rosuvastatin (CRESTOR) 10 mg tablet    [facility] # [phone]      PT # [phone]

## 2023-02-20 ENCOUNTER — TELEPHONE (OUTPATIENT)
Dept: INTERNAL MEDICINE CLINIC | Age: 73
End: 2023-02-20

## 2023-02-20 DIAGNOSIS — G44.89 OTHER HEADACHE SYNDROME: ICD-10-CM

## 2023-02-20 RX ORDER — PREDNISONE 10 MG/1
TABLET ORAL
Qty: 30 TABLET | Refills: 0 | Status: SHIPPED | OUTPATIENT
Start: 2023-02-20

## 2023-02-20 NOTE — TELEPHONE ENCOUNTER
----- Message from Arlen Bone sent at 2/20/2023  8:51 AM EST -----  Subject: Refill Request    QUESTIONS  Name of Medication? predniSONE (DELTASONE) 10 mg tablet  Patient-reported dosage and instructions? 10MG take three pills/day  How many days do you have left? 0  [facility]? [id number]  Pharmacy phone number (if available)? [phone]  Additional Information for Provider? Pt ran out of the Rx on 2/18/23.   ---------------------------------------------------------------------------  --------------  CALL BACK INFO  What is the best way for the office to contact you? OK to leave message on   voicemail  Preferred Call Back Phone Number? [phone]  ---------------------------------------------------------------------------  --------------  SCRIPT ANSWERS  Relationship to Patient?  Self

## 2023-02-21 DIAGNOSIS — E78.5 HYPERLIPIDEMIA, UNSPECIFIED HYPERLIPIDEMIA TYPE: ICD-10-CM

## 2023-02-21 RX ORDER — LISINOPRIL AND HYDROCHLOROTHIAZIDE 20; 25 MG/1; MG/1
TABLET ORAL
Qty: 30 TABLET | Refills: 2 | Status: SHIPPED | OUTPATIENT
Start: 2023-02-21

## 2023-02-21 RX ORDER — AMLODIPINE BESYLATE 10 MG/1
TABLET ORAL
Qty: 30 TABLET | Refills: 2 | Status: SHIPPED | OUTPATIENT
Start: 2023-02-21

## 2023-03-02 ENCOUNTER — OFFICE VISIT (OUTPATIENT)
Dept: INTERNAL MEDICINE CLINIC | Age: 73
End: 2023-03-02
Payer: MEDICARE

## 2023-03-02 VITALS
SYSTOLIC BLOOD PRESSURE: 127 MMHG | DIASTOLIC BLOOD PRESSURE: 73 MMHG | HEART RATE: 94 BPM | WEIGHT: 154.6 LBS | HEIGHT: 66 IN | BODY MASS INDEX: 24.85 KG/M2 | TEMPERATURE: 99.3 F | OXYGEN SATURATION: 96 % | RESPIRATION RATE: 14 BRPM

## 2023-03-02 DIAGNOSIS — S16.1XXA STRAIN OF NECK MUSCLE, INITIAL ENCOUNTER: Primary | ICD-10-CM

## 2023-03-02 PROCEDURE — 99213 OFFICE O/P EST LOW 20 MIN: CPT | Performed by: INTERNAL MEDICINE

## 2023-03-02 PROCEDURE — 1090F PRES/ABSN URINE INCON ASSESS: CPT | Performed by: INTERNAL MEDICINE

## 2023-03-02 PROCEDURE — G0463 HOSPITAL OUTPT CLINIC VISIT: HCPCS | Performed by: INTERNAL MEDICINE

## 2023-03-02 PROCEDURE — G8536 NO DOC ELDER MAL SCRN: HCPCS | Performed by: INTERNAL MEDICINE

## 2023-03-02 PROCEDURE — G8427 DOCREV CUR MEDS BY ELIG CLIN: HCPCS | Performed by: INTERNAL MEDICINE

## 2023-03-02 PROCEDURE — G8420 CALC BMI NORM PARAMETERS: HCPCS | Performed by: INTERNAL MEDICINE

## 2023-03-02 PROCEDURE — 1101F PT FALLS ASSESS-DOCD LE1/YR: CPT | Performed by: INTERNAL MEDICINE

## 2023-03-02 PROCEDURE — G8399 PT W/DXA RESULTS DOCUMENT: HCPCS | Performed by: INTERNAL MEDICINE

## 2023-03-02 PROCEDURE — 3017F COLORECTAL CA SCREEN DOC REV: CPT | Performed by: INTERNAL MEDICINE

## 2023-03-02 PROCEDURE — G8510 SCR DEP NEG, NO PLAN REQD: HCPCS | Performed by: INTERNAL MEDICINE

## 2023-03-02 RX ORDER — CYCLOBENZAPRINE HCL 5 MG
5 TABLET ORAL
Qty: 30 TABLET | Refills: 0 | Status: SHIPPED | OUTPATIENT
Start: 2023-03-02

## 2023-03-02 NOTE — PROGRESS NOTES
Note   Chief Complaint   Neck pain    Juana Voss is a 74 y.o. female     1. Strain of neck muscle, initial encounter  -     cyclobenzaprine (FLEXERIL) 5 mg tablet; Take 1 Tablet by mouth three (3) times daily as needed for Muscle Spasm(s). , Normal, Disp-30 Tablet, R-0  -     REFERRAL TO PHYSICAL THERAPY   Ibuprofen tylenol - dulls pain but it comes back  Ongoing for the past week  Has used OTC creams  Worse with turning head  Radiates to shoulders and top of head   Tried prdnisone that she had available for her UC  Helped a little, took edge off   No arm weakness   No fevers, chills  2 screens at work   Likely muscle strain. Rec flexeril, exercises. Call if no improvement      Benefits, risks, possible drug interactions, and side effects of all new medications were reviewed with the patient. Pt verbalized understanding. Return to clinic:  prn    An electronic signature was used to authenticate this note. Sybil Bustos MD  [facility]  3/2/2023    Future Appointments   Date Time Provider Gabby Debbie   8/31/2023  8:20 AM Stacia Childers MD [facility] AMB        Objective   Vitals:       Visit Vitals  /73 (BP 1 Location: Left upper arm, BP Patient Position: Sitting, BP Cuff Size: Small adult)   Pulse 94   Temp 99.3 °F (37.4 °C) (Oral)   Resp 14   Ht 5' 6\" (1.676 m)   Wt 154 lb 9.6 oz (70.1 kg)   SpO2 96%   BMI 24.95 kg/m²        Physical Exam  Constitutional:       Appearance: Normal appearance. She is not ill-appearing. Pulmonary:      Effort: No respiratory distress. Musculoskeletal:      Comments: Normal ROM of neck, no cervical spinal tenderness, +BL trapezius tightness marshall neck, upper back   Neurological:      Mental Status: She is alert. Current Outpatient Medications   Medication Sig    cyclobenzaprine (FLEXERIL) 5 mg tablet Take 1 Tablet by mouth three (3) times daily as needed for Muscle Spasm(s).     amLODIPine (NORVASC) 10 mg tablet TAKE 1 TABLET BY MOUTH DAILY    lisinopril-hydroCHLOROthiazide (PRINZIDE, ZESTORETIC) 20-25 mg per tablet TAKE 1 TABLET BY MOUTH EVERY DAY    predniSONE (DELTASONE) 10 mg tablet TAKE 4 TABLET FOR 3 DAYS, 3 TABLETS FOR 3 DAYS, 2 TABLETS FOR 3 DAYS, 1 TABLET FOR 3 DAYS    rosuvastatin (CRESTOR) 20 mg tablet Take 1 Tablet by mouth nightly. meclizine (ANTIVERT) 25 mg tablet TAKE 1 TABLET BY MOUTH THREE TIMES DAILY AS NEEDED FOR DIZZINESS    letrozole (FEMARA) 2.5 mg tablet TK 1 T PO QD    cholecalciferol (VITAMIN D3) 400 unit tab tablet Take 600 Units by mouth daily. No current facility-administered medications for this visit.

## 2023-03-03 ENCOUNTER — DOCUMENTATION ONLY (OUTPATIENT)
Dept: INTERNAL MEDICINE CLINIC | Age: 73
End: 2023-03-03

## 2023-03-03 NOTE — PROGRESS NOTES
PA completed by nurse via covermymeds. PA pending insurance approval please see below for PA key.   Key: [id number] - PA Case ID: [id number] - Rx #: [id number]  Status  [facility]  Drug  Cyclobenzaprine HCl 5MG tablets

## 2023-03-08 DIAGNOSIS — G44.89 OTHER HEADACHE SYNDROME: ICD-10-CM

## 2023-03-08 DIAGNOSIS — R42 VERTIGO: ICD-10-CM

## 2023-03-08 RX ORDER — MECLIZINE HYDROCHLORIDE 25 MG/1
TABLET ORAL
Qty: 30 TABLET | Refills: 0 | Status: SHIPPED | OUTPATIENT
Start: 2023-03-08

## 2023-03-08 RX ORDER — PREDNISONE 10 MG/1
TABLET ORAL
Qty: 30 TABLET | Refills: 0 | Status: SHIPPED | OUTPATIENT
Start: 2023-03-08

## 2023-03-13 DIAGNOSIS — E78.5 HYPERLIPIDEMIA, UNSPECIFIED HYPERLIPIDEMIA TYPE: ICD-10-CM

## 2023-03-13 RX ORDER — LISINOPRIL AND HYDROCHLOROTHIAZIDE 20; 25 MG/1; MG/1
TABLET ORAL
Qty: 30 TABLET | Refills: 2 | Status: SHIPPED | OUTPATIENT
Start: 2023-03-13

## 2023-03-16 DIAGNOSIS — E78.5 HYPERLIPIDEMIA, UNSPECIFIED HYPERLIPIDEMIA TYPE: ICD-10-CM

## 2023-03-16 RX ORDER — AMLODIPINE BESYLATE 10 MG/1
TABLET ORAL
Qty: 30 TABLET | Refills: 2 | Status: SHIPPED | OUTPATIENT
Start: 2023-03-16

## 2023-05-04 DIAGNOSIS — G44.89 OTHER HEADACHE SYNDROME: ICD-10-CM

## 2023-05-05 RX ORDER — PREDNISONE 10 MG/1
TABLET ORAL
Qty: 30 TABLET | Refills: 0 | Status: SHIPPED | OUTPATIENT
Start: 2023-05-05

## 2023-05-30 RX ORDER — PREDNISONE 10 MG/1
TABLET ORAL
Qty: 30 TABLET | OUTPATIENT
Start: 2023-05-30

## 2023-05-31 ENCOUNTER — OFFICE VISIT (OUTPATIENT)
Age: 73
End: 2023-05-31
Payer: MEDICARE

## 2023-05-31 VITALS
BODY MASS INDEX: 24.49 KG/M2 | HEART RATE: 98 BPM | RESPIRATION RATE: 14 BRPM | HEIGHT: 66 IN | DIASTOLIC BLOOD PRESSURE: 82 MMHG | WEIGHT: 152.4 LBS | SYSTOLIC BLOOD PRESSURE: 123 MMHG | OXYGEN SATURATION: 97 %

## 2023-05-31 DIAGNOSIS — K51.211 ULCERATIVE (CHRONIC) PROCTITIS WITH RECTAL BLEEDING (HCC): ICD-10-CM

## 2023-05-31 DIAGNOSIS — M54.2 NECK PAIN: Primary | ICD-10-CM

## 2023-05-31 PROCEDURE — 1090F PRES/ABSN URINE INCON ASSESS: CPT | Performed by: INTERNAL MEDICINE

## 2023-05-31 PROCEDURE — 3017F COLORECTAL CA SCREEN DOC REV: CPT | Performed by: INTERNAL MEDICINE

## 2023-05-31 PROCEDURE — 3078F DIAST BP <80 MM HG: CPT | Performed by: INTERNAL MEDICINE

## 2023-05-31 PROCEDURE — 4004F PT TOBACCO SCREEN RCVD TLK: CPT | Performed by: INTERNAL MEDICINE

## 2023-05-31 PROCEDURE — G8420 CALC BMI NORM PARAMETERS: HCPCS | Performed by: INTERNAL MEDICINE

## 2023-05-31 PROCEDURE — 3074F SYST BP LT 130 MM HG: CPT | Performed by: INTERNAL MEDICINE

## 2023-05-31 PROCEDURE — 99214 OFFICE O/P EST MOD 30 MIN: CPT | Performed by: INTERNAL MEDICINE

## 2023-05-31 PROCEDURE — 1123F ACP DISCUSS/DSCN MKR DOCD: CPT | Performed by: INTERNAL MEDICINE

## 2023-05-31 PROCEDURE — G8428 CUR MEDS NOT DOCUMENT: HCPCS | Performed by: INTERNAL MEDICINE

## 2023-05-31 PROCEDURE — G8399 PT W/DXA RESULTS DOCUMENT: HCPCS | Performed by: INTERNAL MEDICINE

## 2023-05-31 RX ORDER — PREDNISONE 10 MG/1
TABLET ORAL
Qty: 10 TABLET | Refills: 0 | Status: SHIPPED | OUTPATIENT
Start: 2023-05-31 | End: 2023-06-12

## 2023-05-31 RX ORDER — METHOCARBAMOL 500 MG/1
500 TABLET, FILM COATED ORAL 4 TIMES DAILY PRN
Qty: 40 TABLET | Refills: 1 | Status: SHIPPED | OUTPATIENT
Start: 2023-05-31

## 2023-05-31 SDOH — ECONOMIC STABILITY: HOUSING INSECURITY
IN THE LAST 12 MONTHS, WAS THERE A TIME WHEN YOU DID NOT HAVE A STEADY PLACE TO SLEEP OR SLEPT IN A SHELTER (INCLUDING NOW)?: NO

## 2023-05-31 SDOH — ECONOMIC STABILITY: FOOD INSECURITY: WITHIN THE PAST 12 MONTHS, THE FOOD YOU BOUGHT JUST DIDN'T LAST AND YOU DIDN'T HAVE MONEY TO GET MORE.: NEVER TRUE

## 2023-05-31 SDOH — ECONOMIC STABILITY: INCOME INSECURITY: HOW HARD IS IT FOR YOU TO PAY FOR THE VERY BASICS LIKE FOOD, HOUSING, MEDICAL CARE, AND HEATING?: NOT HARD AT ALL

## 2023-05-31 SDOH — ECONOMIC STABILITY: FOOD INSECURITY: WITHIN THE PAST 12 MONTHS, YOU WORRIED THAT YOUR FOOD WOULD RUN OUT BEFORE YOU GOT MONEY TO BUY MORE.: NEVER TRUE

## 2023-05-31 ASSESSMENT — PATIENT HEALTH QUESTIONNAIRE - PHQ9
SUM OF ALL RESPONSES TO PHQ QUESTIONS 1-9: 4
1. LITTLE INTEREST OR PLEASURE IN DOING THINGS: 2
SUM OF ALL RESPONSES TO PHQ QUESTIONS 1-9: 4
SUM OF ALL RESPONSES TO PHQ QUESTIONS 1-9: 4
SUM OF ALL RESPONSES TO PHQ9 QUESTIONS 1 & 2: 4
SUM OF ALL RESPONSES TO PHQ QUESTIONS 1-9: 4
2. FEELING DOWN, DEPRESSED OR HOPELESS: 2

## 2023-05-31 NOTE — PROGRESS NOTES
Note   Chief Complaint   Neck pain    Hilario Sahni is a 74 y.o. female     1. Neck pain  -     XR CERVICAL SPINE (2-3 VIEWS); Future  -     predniSONE (DELTASONE) 10 MG tablet; Take 4 tablets by mouth daily for 3 days, THEN 3 tablets daily for 3 days, THEN 2 tablets daily for 3 days, THEN 1 tablet daily for 3 days. , Disp-10 tablet, R-0Normal  -     methocarbamol (ROBAXIN) 500 MG tablet; Take 1 tablet by mouth 4 times daily as needed (neck pain), Disp-40 tablet, R-1Normal  2. Ulcerative (chronic) proctitis with rectal bleeding (HCC)  -     [facility] - Anuradha Fournier MD, Gastroenterology, [facility] )  -     Lázaro Mcdonald MD, Gastroenterology, [facility] )       Maybe got a little better after her last visit  PT helped some  But still having a hard time finding a comfortable spot  Ibuprofen takes edge off  No numbness/tingling  Having UC symptoms - diarrhea, blood in stools, fecal urgency  Started 2 weeks ago  Prednisone helped neck symptoms in the past, pt requesting prednisone  She is trying to get budesonide from her GI doctor for her UC flare. Given 12 day prednisone taper, but advised pt to stop prednisone if GI gives her budesonide, will try to contact GI for guidance. Methocarbamol also sent  Second opinion GI provided as requested      Benefits, risks, possible drug interactions, and side effects of all new medications were reviewed with the patient. Pt verbalized understanding. Return to clinic:  as needed    An electronic signature was used to authenticate this note.   Ghada Wiseman MD  [facility]  6/1/2023    Future Appointments   Date Time Provider Jack Gale   8/31/2023  8:20 AM Jazmine Donnelly MD [facility] AM[facility]        Objective   Vitals:       /82 (Site: Left Upper Arm, Position: Sitting, Cuff Size: Medium Adult)   Pulse 98   Resp 14   Ht 5' 6\" (1.676 m)   Wt 152 lb 6.4 oz (69.1 kg)   SpO2 97%   Breastfeeding

## 2023-06-02 ENCOUNTER — TELEPHONE (OUTPATIENT)
Age: 73
End: 2023-06-02

## 2023-06-02 NOTE — TELEPHONE ENCOUNTER
----- Message from Ghada Wiseman MD sent at 1/7/4682  6:57 AM EDT -----  Regarding: call  Please call pt - I forgot to give her the GI referrals for second opinion, and I forgot which practice she already goes to, so I put one in for both [facility] and [facility]. Please forward information. Who is she currently seeing so I can call them to ask about her UC flare?  thanks

## 2023-06-02 NOTE — TELEPHONE ENCOUNTER
Nurse called patient to advise of providers message. Nurse gave patient referral information. Patient see's Dr Jossy Baxter for GI

## 2023-06-11 RX ORDER — LISINOPRIL AND HYDROCHLOROTHIAZIDE 25; 20 MG/1; MG/1
TABLET ORAL
Qty: 90 TABLET | Refills: 1 | Status: SHIPPED | OUTPATIENT
Start: 2023-06-11

## 2023-06-28 RX ORDER — ROSUVASTATIN CALCIUM 20 MG/1
TABLET, COATED ORAL
Qty: 90 TABLET | Refills: 1 | Status: SHIPPED | OUTPATIENT
Start: 2023-06-28

## 2023-07-18 RX ORDER — MECLIZINE HYDROCHLORIDE 25 MG/1
TABLET ORAL
Qty: 30 TABLET | Refills: 0 | Status: SHIPPED | OUTPATIENT
Start: 2023-07-18

## 2023-08-14 RX ORDER — AMLODIPINE BESYLATE 10 MG/1
TABLET ORAL
Qty: 90 TABLET | Refills: 0 | Status: SHIPPED | OUTPATIENT
Start: 2023-08-14 | End: 2023-09-11

## 2023-08-14 RX ORDER — LISINOPRIL AND HYDROCHLOROTHIAZIDE 25; 20 MG/1; MG/1
TABLET ORAL
Qty: 90 TABLET | Refills: 0 | Status: SHIPPED | OUTPATIENT
Start: 2023-08-14 | End: 2023-09-14

## 2023-08-18 ENCOUNTER — HOSPITAL ENCOUNTER (OUTPATIENT)
Facility: HOSPITAL | Age: 73
Setting detail: OUTPATIENT SURGERY
Discharge: HOME | End: 2023-08-18
Attending: INTERNAL MEDICINE | Admitting: INTERNAL MEDICINE
Payer: MEDICARE

## 2023-08-18 ENCOUNTER — ANESTHESIA (OUTPATIENT)
Facility: HOSPITAL | Age: 73
End: 2023-08-18
Payer: MEDICARE

## 2023-08-18 ENCOUNTER — ANESTHESIA EVENT (OUTPATIENT)
Facility: HOSPITAL | Age: 73
End: 2023-08-18
Payer: MEDICARE

## 2023-08-18 VITALS
TEMPERATURE: 96.6 F | WEIGHT: 152.12 LBS | OXYGEN SATURATION: 99 % | HEART RATE: 77 BPM | HEIGHT: 63 IN | BODY MASS INDEX: 26.95 KG/M2 | RESPIRATION RATE: 16 BRPM | DIASTOLIC BLOOD PRESSURE: 88 MMHG | SYSTOLIC BLOOD PRESSURE: 127 MMHG

## 2023-08-18 PROCEDURE — 7100000010 HC PHASE II RECOVERY - FIRST 15 MIN: Performed by: INTERNAL MEDICINE

## 2023-08-18 PROCEDURE — 2500000003 HC RX 250 WO HCPCS: Performed by: NURSE ANESTHETIST, CERTIFIED REGISTERED

## 2023-08-18 PROCEDURE — 2580000003 HC RX 258: Performed by: NURSE ANESTHETIST, CERTIFIED REGISTERED

## 2023-08-18 PROCEDURE — 3700000001 HC ADD 15 MINUTES (ANESTHESIA): Performed by: INTERNAL MEDICINE

## 2023-08-18 PROCEDURE — 3600007512: Performed by: INTERNAL MEDICINE

## 2023-08-18 PROCEDURE — 88305 TISSUE EXAM BY PATHOLOGIST: CPT

## 2023-08-18 PROCEDURE — 7100000011 HC PHASE II RECOVERY - ADDTL 15 MIN: Performed by: INTERNAL MEDICINE

## 2023-08-18 PROCEDURE — 3600007502: Performed by: INTERNAL MEDICINE

## 2023-08-18 PROCEDURE — 3700000000 HC ANESTHESIA ATTENDED CARE: Performed by: INTERNAL MEDICINE

## 2023-08-18 PROCEDURE — 6360000002 HC RX W HCPCS: Performed by: NURSE ANESTHETIST, CERTIFIED REGISTERED

## 2023-08-18 RX ORDER — PROPOFOL 10 MG/ML
INJECTION, EMULSION INTRAVENOUS PRN
Status: DISCONTINUED | OUTPATIENT
Start: 2023-08-18 | End: 2023-08-18 | Stop reason: SDUPTHER

## 2023-08-18 RX ORDER — SODIUM CHLORIDE 9 MG/ML
INJECTION, SOLUTION INTRAVENOUS CONTINUOUS
Status: DISCONTINUED | OUTPATIENT
Start: 2023-08-18 | End: 2023-08-18 | Stop reason: HOSPADM

## 2023-08-18 RX ORDER — PROPOFOL 10 MG/ML
INJECTION, EMULSION INTRAVENOUS CONTINUOUS PRN
Status: DISCONTINUED | OUTPATIENT
Start: 2023-08-18 | End: 2023-08-18 | Stop reason: SDUPTHER

## 2023-08-18 RX ORDER — LIDOCAINE HYDROCHLORIDE 20 MG/ML
INJECTION, SOLUTION EPIDURAL; INFILTRATION; INTRACAUDAL; PERINEURAL PRN
Status: DISCONTINUED | OUTPATIENT
Start: 2023-08-18 | End: 2023-08-18 | Stop reason: SDUPTHER

## 2023-08-18 RX ORDER — SODIUM CHLORIDE 9 MG/ML
INJECTION, SOLUTION INTRAVENOUS CONTINUOUS PRN
Status: DISCONTINUED | OUTPATIENT
Start: 2023-08-18 | End: 2023-08-18 | Stop reason: SDUPTHER

## 2023-08-18 RX ADMIN — SODIUM CHLORIDE: 9 INJECTION, SOLUTION INTRAVENOUS at 08:48

## 2023-08-18 RX ADMIN — LIDOCAINE HYDROCHLORIDE 20 MG: 20 INJECTION, SOLUTION EPIDURAL; INFILTRATION; INTRACAUDAL; PERINEURAL at 08:54

## 2023-08-18 RX ADMIN — PROPOFOL 120 MCG/KG/MIN: 10 INJECTION, EMULSION INTRAVENOUS at 08:54

## 2023-08-18 RX ADMIN — PROPOFOL 70 MG: 10 INJECTION, EMULSION INTRAVENOUS at 08:54

## 2023-08-18 ASSESSMENT — PAIN - FUNCTIONAL ASSESSMENT: PAIN_FUNCTIONAL_ASSESSMENT: NONE - DENIES PAIN

## 2023-08-18 NOTE — DISCHARGE INSTRUCTIONS
[address] Sigrid Gosselin, M.D.  [phone]            COLON DISCHARGE INSTRUCTIONS       2023    Leticia Syed  :  1950  [facility] Medical Record Number:  [id number]      COLONOSCOPY FINDINGS:  Your colonoscopy showed active inflammation in the rectum and sigmoid colon, healed mucosa in the remaining left colon and normal mucosa in the right colon. Biopsies were obtained    DISCOMFORT:  Redness at IV site- apply warm compress to area; if redness or soreness persist- contact your physician  There may be a slight amount of blood passed from the rectum  Gaseous discomfort- walking, belching will help relieve any discomfort  You may not operate a vehicle for 12 hours  You may not engage in an occupation involving machinery or appliances for rest of today  You may not drink alcoholic beverages for at least 12 hours  Avoid making any critical decisions for at least 24 hour  DIET:   Regular diet   - however -  remember your colon is empty and a heavy meal will produce gas. Avoid these foods:  vegetables, fried / greasy foods, carbonated drinks for today     ACTIVITY:  You may resume your normal daily activities it is recommended that you spend the remainder of the day resting -  avoid any strenuous activity. CALL M.D. ANY SIGN OF:   Increasing pain, nausea, vomiting  Abdominal distension (swelling)  New increased bleeding (oral or rectal)  Fever (chills)  Pain in chest area  Bloody discharge from nose or mouth   Shortness of breath    Follow-up Instructions:   Call Dr. Amie Frankel if any questions or problems. Telephone # [phone]  Biopsy results will be available in  5 to 7 days  Will send a prescription of mesalamine enemas to your pharmacy and continue with azulfidine and add folic acid daily. Follow-up office visit in 1 to 2 months. Repeat colonoscopy in 1 year.

## 2023-08-18 NOTE — ANESTHESIA POSTPROCEDURE EVALUATION
Department of Anesthesiology  Postprocedure Note    Patient: Brina Bella  MRN: [id number]  YOB: 1950  Date of evaluation: 8/18/2023      Procedure Summary     Date: 08/18/23 Room / Location: [facility] ENDO 02 / [facility]    Anesthesia Start: 0848 Anesthesia Stop: 0815    Procedures:       COLONOSCOPY (Lower GI Region)      COLONOSCOPY BIOPSY/STOMA (Lower GI Region) Diagnosis:       Ulcerative colitis with complication, unspecified location ([address])      (Ulcerative colitis with complication, unspecified location ([address]) [K51.919])    Surgeons: Luisana Haywood MD Responsible Provider: Christie Hinton MD    Anesthesia Type: MAC ASA Status: 2          Anesthesia Type: MAC    Alvaro Phase I: Alvaro Score: 10    Alvaro Phase II: Alvaro Score: 10      Anesthesia Post Evaluation    Patient location during evaluation: PACU  Patient participation: complete - patient participated  Level of consciousness: awake  Pain score: 0  Airway patency: patent  Nausea & Vomiting: no nausea and no vomiting  Complications: no  Cardiovascular status: blood pressure returned to baseline  Respiratory status: acceptable  Hydration status: euvolemic  Pain management: adequate

## 2023-08-18 NOTE — PROGRESS NOTES
Brina Bluen  1950  [id number]    Situation:  Verbal report received from: KIMANI Gerard  Procedure: Procedure(s):  COLONOSCOPY  COLONOSCOPY BIOPSY/STOMA     Background:    Preoperative diagnosis: Ulcerative colitis with complication, unspecified location [facility]) [K51.919]   Postoperative diagnosis: * No post-op diagnosis entered *     :  Dr. Julio King  Assistant(s): Circulator: Fabby Phillips RN  Circulator Assist: Freeman Trujillo RN     Specimens:   ID Type Source Tests Collected by Time Destination   A : right colon bx Tissue Colon SURGICAL PATHOLOGY Luisana Haywood MD 8/18/2023 5409    B : left colon bx Tissue Colon SURGICAL PATHOLOGY Luisana Haywood MD 8/18/2023 0906    C : sigmoid bx Tissue Sigmoid Colon SURGICAL PATHOLOGY Luisana Haywood MD 8/18/2023 0909    D : rectum bx Tissue Rectum SURGICAL PATHOLOGY Luisana Haywood MD 8/18/2023 0911       H. Pylori  No    Assessment:  Intra-procedure medications     Anesthesia gave intra-procedure sedation and medications, see anesthesia flow sheet Yes    Intravenous fluids: NS@ KVO     Vital signs stable yes    Abdominal assessment: round and soft  yes    Recommendation:  Discharge patient per MD order yes.   Return to floor na  Family or Friend family  Permission to share finding with family or friend Yes

## 2023-08-18 NOTE — PROGRESS NOTES
Endoscopy discharge instructions have been reviewed and given to patient. The patient verbalized understanding and acceptance of instructions. Dr. Elinor Denton discussed with patient procedure findings and next steps.

## 2023-08-18 NOTE — PROGRESS NOTES
Report from [address] see anesthesia record. Abdomen remains soft, non-tender; pt denies pain. Scope pre-cleaned at bedside by [employer] RN/Carlos. Report given to [facility] in Recovery.

## 2023-08-18 NOTE — H&P
The patient is a 74year old female who presents with a complaint of Ulcerative Colitis. The patient presents for a medication refill. Note for \"Ulcerative Colitis\": Patient is a 74year old female who presents today for follow up. last seen 2021 for follow up on her UC. She was encouraged to take her maintenance medication balsalazide and she was set up for colonoscopy but this was not done. her last colon was 2016. Hyperplastic polyp and quiescent colitis- normal biopsies. She remains on balsalazide 6 pills daily  Recovering from recent flare- had diarrhea, bleeding, pain. It has resolved as of now. Her frequency and consistency of BM is normal- She is having 4 BM's a day. No more blood or pain. No fever or chills  Her weight is stable. She denies nausea/vomiting/heartburn or dysphagia. No alcohol, tobacco or drug use  No family hx of colon cancer. She had her well woman exam. She has a hx of breast cancer and has follow up with breast surgeon and oncology. She just had her bone density  She had her eye exam this year  She did have her flu vaccine this past year. 23  Last colonoscopy 2016 - no active colitis. Today, she states she has been experiencing a flare of UC. She states during her lifetime she has had periodic flare. Has some looser stools and bleeding. Needs to get colonoscopy set up. Problem List/Past Medical (Doc Galicia; 2023 11:09 AM)  Hypertension    Hypercholesterolemia    Breast Cancer    Ulcerative Colitis    History of colon polyps (Z86.010)    Ulcerative colitis, universal (K51.00)    Problems Reconciled      Past Surgical History (Doc Galicia; 2023 11:09 AM)  Mastectomy; Subtotal - Right    Melanoma Excision    Lumpectomy   Left.  Delivery      Allergies (Doc Salmeron; 2023 11:09 AM)  Erythromycins    Allergies Reconciled      Medication History (Doc Salmeron; 2023 11:10 AM)  lisinopriL-hydrochlorothiazide  (20-25mg tablet, Markos RODRIGUEZ; 6/9/2023 11:26 AM)  General  Mental Status - Alert. General Appearance - Cooperative, Pleasant and Consistent with stated age, Not Anxious, Not in acute distress. Orientation - Oriented X3. Build & Nutrition - Well nourished and Well developed. Chest and Lung Exam  Chest and lung exam reveals  - normal excursion with symmetric chest walls, quiet, even and easy respiratory effort with no use of accessory muscles and on auscultation, normal breath sounds, no adventitious sounds and normal vocal resonance. Cardiovascular  Cardiovascular examination reveals  - normal heart sounds, regular rate and rhythm with no murmurs. Abdomen  Inspection  Inspection of the abdomen reveals - Soft, Non-distended. Incisional scars - No incisional scars. Palpation/Percussion  Tenderness - Non-Tender. Rebound tenderness - No rebound. Liver - No hepatosplenomegaly. Abdominal Mass Palpable - No masses. Other Characteristics - No Ascites. Auscultation  Auscultation of the abdomen reveals - Bowel sounds normal.    Peripheral Vascular  Lower Extremity  Palpation - Edema - Bilateral - No edema - Bilateral.        Assessment & Plan Sudarshan RODRIGUEZ; 6/9/2023 11:42 AM)    Ulcerative colitis, universal (K51.00) <HCC35>  Story: Last colonoscopy 2016. Impression: I discussed with patient that ultimately may need to consider transition to biologic if she has been having flares regularly while on balsalazide. However, for now, she prefers treating with prednisone and getting repeat colonoscopy set up. Strongly consider transition to biologic if active disease noted on colonoscopy. Plan for follow up 2 weeks after colonoscopy. Current Plans  COLONOSCOPY/BIOPSY/POLYP (17098) (75824)  Started Plenvu 140 gram-9 gram-5.2 gram powder packs, 1 unit Milliliter Take as directed before Colonoscopy, 140 Milliliter, 06/09/2023, No Refill.   Local Order:  Pharmacist Notes: WUQ:[id number] PNC:[id number] HAQQF:[id number]

## 2023-08-18 NOTE — OP NOTE
2023          Colonoscopy Operative Report  Gisella   :  1950  [facility] Medical Record Number:    [id number]      Indications:    Ulcerative colitis     :  Megan Williamson MD    Referring Provider: Tristen Williamson MD    Sedation:  MAC anesthesia    Pre-Procedural Exam:      Airway: clear,  No airway problems anticipated  Heart: RRR, without gallops or rubs  Lungs: clear bilaterally without wheezes, crackles, or rhonchi  Abdomen: soft, nontender, nondistended, bowel sounds present  Mental Status: awake, alert and oriented to person, place and time     Procedure Details:  After informed consent was obtained with all risks and benefits of procedure explained and preoperative exam completed, the patient was taken to the endoscopy suite and placed in the left lateral decubitus position. Upon sequential sedation as per above, a digital rectal exam was performed. The Olympus videocolonoscope  was inserted in the rectum and carefully advanced to the cecum, which was identified by the ileocecal valve and appendiceal orifice, terminal ileum. The quality of preparation was good. The colonoscope was slowly withdrawn with careful inspection and evaluation between folds. Retroflexion in the rectum was performed. Findings:   Terminal Ileum: normal  Cecum: normal  Ascending Colon: normal  Transverse Colon: Healed mucosa with pseudopolyps seen  Descending Colon: Healed mucosa with pseudopolyps seen  Sigmoid: Evidence of active ulcerative colitis with erythema, pinpoint ulcerations and whitish exudates seen  Rectum: Active proctitis with erythema, pinpoint ulcerations and whitish exudates seen    Interventions:  none    Specimen Removed:  Right colon, left colon, sigmoid and rectum    Complications: None. EBL:  None.     Impression:  Evidence of active proctosigmoiditis, otherwise remaining colon showing healed mucosa    Recommendations:  - Will add mesalamine enemas and optimize oral mesalamine  -

## 2023-08-31 ENCOUNTER — OFFICE VISIT (OUTPATIENT)
Age: 73
End: 2023-08-31
Payer: MEDICARE

## 2023-08-31 VITALS
DIASTOLIC BLOOD PRESSURE: 60 MMHG | OXYGEN SATURATION: 94 % | HEART RATE: 74 BPM | WEIGHT: 155 LBS | BODY MASS INDEX: 27.46 KG/M2 | SYSTOLIC BLOOD PRESSURE: 130 MMHG | HEIGHT: 63 IN

## 2023-08-31 DIAGNOSIS — I10 ESSENTIAL HYPERTENSION: ICD-10-CM

## 2023-08-31 DIAGNOSIS — I10 ESSENTIAL (PRIMARY) HYPERTENSION: ICD-10-CM

## 2023-08-31 DIAGNOSIS — E78.5 HYPERLIPIDEMIA, UNSPECIFIED HYPERLIPIDEMIA TYPE: Primary | ICD-10-CM

## 2023-08-31 PROCEDURE — 1036F TOBACCO NON-USER: CPT | Performed by: SPECIALIST

## 2023-08-31 PROCEDURE — 99214 OFFICE O/P EST MOD 30 MIN: CPT | Performed by: SPECIALIST

## 2023-08-31 PROCEDURE — 1123F ACP DISCUSS/DSCN MKR DOCD: CPT | Performed by: SPECIALIST

## 2023-08-31 PROCEDURE — G8399 PT W/DXA RESULTS DOCUMENT: HCPCS | Performed by: SPECIALIST

## 2023-08-31 PROCEDURE — 93005 ELECTROCARDIOGRAM TRACING: CPT | Performed by: SPECIALIST

## 2023-08-31 PROCEDURE — G8427 DOCREV CUR MEDS BY ELIG CLIN: HCPCS | Performed by: SPECIALIST

## 2023-08-31 PROCEDURE — 1090F PRES/ABSN URINE INCON ASSESS: CPT | Performed by: SPECIALIST

## 2023-08-31 PROCEDURE — 93010 ELECTROCARDIOGRAM REPORT: CPT | Performed by: SPECIALIST

## 2023-08-31 PROCEDURE — 3075F SYST BP GE 130 - 139MM HG: CPT | Performed by: SPECIALIST

## 2023-08-31 PROCEDURE — 3078F DIAST BP <80 MM HG: CPT | Performed by: SPECIALIST

## 2023-08-31 PROCEDURE — 3017F COLORECTAL CA SCREEN DOC REV: CPT | Performed by: SPECIALIST

## 2023-08-31 PROCEDURE — G8419 CALC BMI OUT NRM PARAM NOF/U: HCPCS | Performed by: SPECIALIST

## 2023-08-31 RX ORDER — BALSALAZIDE DISODIUM 750 MG/1
2250 CAPSULE ORAL 3 TIMES DAILY
COMMUNITY

## 2023-08-31 NOTE — PROGRESS NOTES
Darcy Lopez MD. [facility]          Patient: Dina Champion  : 1950      Today's Date: 2023        HISTORY OF PRESENT ILLNESS:     History of Present Illness:    Doing well - no cardiac complaints       PAST MEDICAL HISTORY:     Past Medical History:   Diagnosis Date    Agatston coronary artery calcium score less than 100     CT Heart scan  - CAC score 4    Cancer ([address]) Left    breast, s/p lumpectomy    Dyslipidemia     Labs 14 - chol 284, , HDL 73, , Apo B 146, LDL-P 2626, CRP 10    History of colon polyps     History of right mastectomy     HTN (hypertension)     Malignant melanoma nos Left    arm    Mild carotid artery disease (HCC)     Nausea & vomiting     Nausea only    Ulcerative colitis ([address])        Past Surgical History:   Procedure Laterality Date    BREAST LUMPECTOMY Left     lumpectomy      SECTION      COLONOSCOPY N/A 2016    COLONOSCOPY performed by Vanessa Roberts MD at [address] [place] [address]      2009 polyp    COLONOSCOPY N/A 2023    COLONOSCOPY performed by Johnathon Vazquez MD at [facility] ENDOSCOPY    COLONOSCOPY N/A 2023    COLONOSCOPY BIOPSY/STOMA performed by Johnathon Vazquez MD at [address] [place] [address] Right 2019             CURRENT MEDICATIONS:    .  Current Outpatient Medications   Medication Sig Dispense Refill    balsalazide (COLAZAL) 750 MG capsule Take 3 capsules by mouth 3 times daily      lisinopril-hydroCHLOROthiazide (PRINZIDE;ZESTORETIC) 20-25 MG per tablet TAKE 1 TABLET BY MOUTH EVERY DAY 90 tablet 0    amLODIPine (NORVASC) 10 MG tablet TAKE 1 TABLET BY MOUTH DAILY 90 tablet 0    meclizine (ANTIVERT) 25 MG tablet TAKE 1 TABLET BY MOUTH THREE TIMES DAILY AS NEEDED FOR DIZZINESS 30 tablet 0    rosuvastatin (CRESTOR) 20 MG tablet TAKE 1 TABLET BY MOUTH EVERY NIGHT 90 tablet 1    vitamin D3 (CHOLECALCIFEROL) 10 MCG (400 UNIT) TABS tablet Take 1.5 tablets by mouth daily      letrozole (FEMARA) 2.5 MG tablet        No current

## 2023-09-11 RX ORDER — AMLODIPINE BESYLATE 10 MG/1
TABLET ORAL
Qty: 90 TABLET | Refills: 0 | Status: SHIPPED | OUTPATIENT
Start: 2023-09-11

## 2023-09-14 RX ORDER — LISINOPRIL AND HYDROCHLOROTHIAZIDE 25; 20 MG/1; MG/1
TABLET ORAL
Qty: 90 TABLET | Refills: 0 | Status: SHIPPED | OUTPATIENT
Start: 2023-09-14

## 2023-10-10 NOTE — PROGRESS NOTES
Judith Hernandez (:  1950) is a 74 y.o. female,Established patient, here for evaluation of the following chief complaint(s):  Medicare AWV (AWV, pt is not fasting. ) and Other (Urinary incontinence.)         ASSESSMENT/PLAN:  1. Medicare annual wellness visit, subsequent  2. Essential (primary) hypertension- at goal on lisinopril/hctz 20/25 a day   3. Hyperlipidemia LDL goal <100-cont current dose of crestor   4. Ulcerative (chronic) proctitis with rectal bleeding (HCC)-cont with colazal   5. Malignant neoplasm of breast in female, estrogen receptor positive, unspecified laterality, unspecified site of breast ([address])- stable with oncology   6. Need for prophylactic vaccination against Streptococcus pneumoniae (pneumococcus)  -     Pneumococcal, PCV20, PREVNAR 21, (age 25 yrs+), IM, PF  7. Urinary frequency- will try myrbetriq and see if that helps, urine negative   -     mirabegron (MYRBETRIQ) 25 MG TB24; Take 1 tablet by mouth daily, Disp-30 tablet, R-3Normal  -     AMB POC URINALYSIS DIP STICK AUTO W/ MICRO      Return for Medicare Annual Wellness Visit in 1 year. Lisinopril/hctz 20/25, amlodipine   Crestor  Colazal   Femara     Subjective   SUBJECTIVE/OBJECTIVE:  HPI    She is having some incontinence of urine when she gets up and walks around; no burning , mostly   Subjective:   Judith Hernandez is a 74 y.o. female with hypertension. Hypertension ROS: taking medications as instructed, no medication side effects noted, no TIA's, no chest pain on exertion, no dyspnea on exertion, no swelling of ankles. New concerns: 142/80   Subjective:   Judith Hernandez is a 74 y.o. female with hyperlipidemia. Cardiovascular risk analysis - 74 y.o. female LDL goal is under 130. ROS: taking medications as instructed, no medication side effects noted, no TIA's, no chest pain on exertion, no dyspnea on exertion, no swelling of ankles.  Tolerating meds, no myalgias or other side effects noted  New

## 2023-10-12 ENCOUNTER — OFFICE VISIT (OUTPATIENT)
Age: 73
End: 2023-10-12
Payer: MEDICARE

## 2023-10-12 VITALS
HEART RATE: 86 BPM | BODY MASS INDEX: 27.11 KG/M2 | HEIGHT: 63 IN | WEIGHT: 153 LBS | SYSTOLIC BLOOD PRESSURE: 142 MMHG | DIASTOLIC BLOOD PRESSURE: 80 MMHG | RESPIRATION RATE: 16 BRPM | TEMPERATURE: 98.4 F | OXYGEN SATURATION: 90 %

## 2023-10-12 DIAGNOSIS — K51.211 ULCERATIVE (CHRONIC) PROCTITIS WITH RECTAL BLEEDING (HCC): ICD-10-CM

## 2023-10-12 DIAGNOSIS — Z00.00 MEDICARE ANNUAL WELLNESS VISIT, SUBSEQUENT: Primary | ICD-10-CM

## 2023-10-12 DIAGNOSIS — C50.919 MALIGNANT NEOPLASM OF BREAST IN FEMALE, ESTROGEN RECEPTOR POSITIVE, UNSPECIFIED LATERALITY, UNSPECIFIED SITE OF BREAST (HCC): ICD-10-CM

## 2023-10-12 DIAGNOSIS — R35.0 URINARY FREQUENCY: ICD-10-CM

## 2023-10-12 DIAGNOSIS — Z17.0 MALIGNANT NEOPLASM OF BREAST IN FEMALE, ESTROGEN RECEPTOR POSITIVE, UNSPECIFIED LATERALITY, UNSPECIFIED SITE OF BREAST (HCC): ICD-10-CM

## 2023-10-12 DIAGNOSIS — I10 ESSENTIAL (PRIMARY) HYPERTENSION: ICD-10-CM

## 2023-10-12 DIAGNOSIS — E78.5 HYPERLIPIDEMIA LDL GOAL <100: ICD-10-CM

## 2023-10-12 DIAGNOSIS — Z23 NEED FOR PROPHYLACTIC VACCINATION AGAINST STREPTOCOCCUS PNEUMONIAE (PNEUMOCOCCUS): ICD-10-CM

## 2023-10-12 LAB
BILIRUBIN, URINE, POC: NEGATIVE
BLOOD URINE, POC: NEGATIVE
GLUCOSE URINE, POC: NEGATIVE
KETONES, URINE, POC: ABNORMAL
LEUKOCYTE ESTERASE, URINE, POC: NEGATIVE
NITRITE, URINE, POC: NEGATIVE
PH, URINE, POC: 6.5 (ref 4.6–8)
PROTEIN,URINE, POC: ABNORMAL
SPECIFIC GRAVITY, URINE, POC: 1.02 (ref 1–1.03)
URINALYSIS CLARITY, POC: CLEAR
URINALYSIS COLOR, POC: YELLOW
UROBILINOGEN, POC: ABNORMAL

## 2023-10-12 PROCEDURE — 99214 OFFICE O/P EST MOD 30 MIN: CPT | Performed by: INTERNAL MEDICINE

## 2023-10-12 PROCEDURE — G8399 PT W/DXA RESULTS DOCUMENT: HCPCS | Performed by: INTERNAL MEDICINE

## 2023-10-12 PROCEDURE — G8419 CALC BMI OUT NRM PARAM NOF/U: HCPCS | Performed by: INTERNAL MEDICINE

## 2023-10-12 PROCEDURE — 3017F COLORECTAL CA SCREEN DOC REV: CPT | Performed by: INTERNAL MEDICINE

## 2023-10-12 PROCEDURE — 1123F ACP DISCUSS/DSCN MKR DOCD: CPT | Performed by: INTERNAL MEDICINE

## 2023-10-12 PROCEDURE — PBSHW AMB POC URINALYSIS DIP STICK AUTO W/ MICRO: Performed by: INTERNAL MEDICINE

## 2023-10-12 PROCEDURE — 90677 PCV20 VACCINE IM: CPT | Performed by: INTERNAL MEDICINE

## 2023-10-12 PROCEDURE — G8427 DOCREV CUR MEDS BY ELIG CLIN: HCPCS | Performed by: INTERNAL MEDICINE

## 2023-10-12 PROCEDURE — G0439 PPPS, SUBSEQ VISIT: HCPCS | Performed by: INTERNAL MEDICINE

## 2023-10-12 PROCEDURE — 1036F TOBACCO NON-USER: CPT | Performed by: INTERNAL MEDICINE

## 2023-10-12 PROCEDURE — 3077F SYST BP >= 140 MM HG: CPT | Performed by: INTERNAL MEDICINE

## 2023-10-12 PROCEDURE — 3079F DIAST BP 80-89 MM HG: CPT | Performed by: INTERNAL MEDICINE

## 2023-10-12 PROCEDURE — PBSHW PNEUMOCOCCAL, PCV20, PREVNAR 20, (AGE 6W+), IM, PF: Performed by: INTERNAL MEDICINE

## 2023-10-12 PROCEDURE — G8484 FLU IMMUNIZE NO ADMIN: HCPCS | Performed by: INTERNAL MEDICINE

## 2023-10-12 PROCEDURE — 1090F PRES/ABSN URINE INCON ASSESS: CPT | Performed by: INTERNAL MEDICINE

## 2023-10-12 PROCEDURE — 81001 URINALYSIS AUTO W/SCOPE: CPT | Performed by: INTERNAL MEDICINE

## 2023-10-12 RX ORDER — FOLIC ACID 1 MG/1
1 TABLET ORAL DAILY
COMMUNITY

## 2023-10-12 SDOH — ECONOMIC STABILITY: INCOME INSECURITY: HOW HARD IS IT FOR YOU TO PAY FOR THE VERY BASICS LIKE FOOD, HOUSING, MEDICAL CARE, AND HEATING?: NOT HARD AT ALL

## 2023-10-12 SDOH — ECONOMIC STABILITY: FOOD INSECURITY: WITHIN THE PAST 12 MONTHS, THE FOOD YOU BOUGHT JUST DIDN'T LAST AND YOU DIDN'T HAVE MONEY TO GET MORE.: NEVER TRUE

## 2023-10-12 SDOH — ECONOMIC STABILITY: FOOD INSECURITY: WITHIN THE PAST 12 MONTHS, YOU WORRIED THAT YOUR FOOD WOULD RUN OUT BEFORE YOU GOT MONEY TO BUY MORE.: NEVER TRUE

## 2023-10-12 ASSESSMENT — PATIENT HEALTH QUESTIONNAIRE - PHQ9
SUM OF ALL RESPONSES TO PHQ9 QUESTIONS 1 & 2: 0
1. LITTLE INTEREST OR PLEASURE IN DOING THINGS: 0
SUM OF ALL RESPONSES TO PHQ QUESTIONS 1-9: 0
2. FEELING DOWN, DEPRESSED OR HOPELESS: 0
SUM OF ALL RESPONSES TO PHQ QUESTIONS 1-9: 0

## 2023-10-12 ASSESSMENT — LIFESTYLE VARIABLES
HOW OFTEN DO YOU HAVE A DRINK CONTAINING ALCOHOL: NEVER
HOW MANY STANDARD DRINKS CONTAINING ALCOHOL DO YOU HAVE ON A TYPICAL DAY: PATIENT DOES NOT DRINK

## 2023-10-12 NOTE — PATIENT INSTRUCTIONS
[id number] Healthwise, [employer]. Care instructions adapted under license by [employer]. If you have questions about a medical condition or this instruction, always ask your healthcare professional. [address] any warranty or liability for your use of this information. Personalized Preventive Plan for Brina Bella - 10/12/2023  Medicare offers a range of preventive health benefits. Some of the tests and screenings are paid in full while other may be subject to a deductible, co-insurance, and/or copay. Some of these benefits include a comprehensive review of your medical history including lifestyle, illnesses that may run in your family, and various assessments and screenings as appropriate. After reviewing your medical record and screening and assessments performed today your provider may have ordered immunizations, labs, imaging, and/or referrals for you. A list of these orders (if applicable) as well as your Preventive Care list are included within your After Visit Summary for your review. Other Preventive Recommendations:    A preventive eye exam performed by an eye specialist is recommended every 1-2 years to screen for glaucoma; cataracts, macular degeneration, and other eye disorders. A preventive dental visit is recommended every 6 months. Try to get at least 150 minutes of exercise per week or 10,000 steps per day on a pedometer . Order or download the FREE \"Exercise & Physical Activity: Your Everyday Guide\" from [employer]. Call 5-794.258.5070 or search [employer] online. You need 9388-3637 mg of calcium and 4653-5097 IU of vitamin D per day. It is possible to meet your calcium requirement with diet alone, but a vitamin D supplement is usually necessary to meet this goal.  When exposed to the sun, use a sunscreen that protects against both UVA and UVB radiation with an SPF of 30 or greater.  Reapply every 2 to

## 2023-10-26 LAB
ALBUMIN SERPL-MCNC: 4.9 G/DL (ref 3.8–4.8)
ALBUMIN/GLOB SERPL: 1.8 {RATIO} (ref 1.2–2.2)
ALP SERPL-CCNC: 72 IU/L (ref 44–121)
ALT SERPL-CCNC: 11 IU/L (ref 0–32)
AST SERPL-CCNC: 19 IU/L (ref 0–40)
BASOPHILS # BLD AUTO: 0.1 X10E3/UL (ref 0–0.2)
BASOPHILS NFR BLD AUTO: 1 %
BILIRUB SERPL-MCNC: 0.3 MG/DL (ref 0–1.2)
BUN SERPL-MCNC: 19 MG/DL (ref 8–27)
BUN/CREAT SERPL: 20 (ref 12–28)
CALCIUM SERPL-MCNC: 11.1 MG/DL (ref 8.7–10.3)
CHLORIDE SERPL-SCNC: 99 MMOL/L (ref 96–106)
CO2 SERPL-SCNC: 25 MMOL/L (ref 20–29)
CREAT SERPL-MCNC: 0.95 MG/DL (ref 0.57–1)
EGFRCR SERPLBLD CKD-EPI 2021: 62 ML/MIN/1.73
EOSINOPHIL # BLD AUTO: 0.3 X10E3/UL (ref 0–0.4)
EOSINOPHIL NFR BLD AUTO: 3 %
ERYTHROCYTE [DISTWIDTH] IN BLOOD BY AUTOMATED COUNT: 13.6 % (ref 11.7–15.4)
GLOBULIN SER CALC-MCNC: 2.7 G/DL (ref 1.5–4.5)
GLUCOSE SERPL-MCNC: 93 MG/DL (ref 70–99)
HCT VFR BLD AUTO: 44.3 % (ref 34–46.6)
HGB BLD-MCNC: 14.6 G/DL (ref 11.1–15.9)
IMM GRANULOCYTES # BLD AUTO: 0 X10E3/UL (ref 0–0.1)
IMM GRANULOCYTES NFR BLD AUTO: 0 %
LYMPHOCYTES # BLD AUTO: 2.3 X10E3/UL (ref 0.7–3.1)
LYMPHOCYTES NFR BLD AUTO: 24 %
MCH RBC QN AUTO: 27.3 PG (ref 26.6–33)
MCHC RBC AUTO-ENTMCNC: 33 G/DL (ref 31.5–35.7)
MCV RBC AUTO: 83 FL (ref 79–97)
MONOCYTES # BLD AUTO: 0.7 X10E3/UL (ref 0.1–0.9)
MONOCYTES NFR BLD AUTO: 8 %
NEUTROPHILS # BLD AUTO: 6 X10E3/UL (ref 1.4–7)
NEUTROPHILS NFR BLD AUTO: 64 %
PLATELET # BLD AUTO: 474 X10E3/UL (ref 150–450)
POTASSIUM SERPL-SCNC: 4.3 MMOL/L (ref 3.5–5.2)
PROT SERPL-MCNC: 7.6 G/DL (ref 6–8.5)
RBC # BLD AUTO: 5.34 X10E6/UL (ref 3.77–5.28)
SODIUM SERPL-SCNC: 140 MMOL/L (ref 134–144)
TSH SERPL DL<=0.005 MIU/L-ACNC: 2.38 UIU/ML (ref 0.45–4.5)
WBC # BLD AUTO: 9.4 X10E3/UL (ref 3.4–10.8)

## 2023-10-27 LAB
CHOLEST SERPL-MCNC: 194 MG/DL (ref 100–199)
HDL SERPL-SCNC: 47.4 UMOL/L
HDLC SERPL-MCNC: 86 MG/DL
IMP & REVIEW OF LAB RESULTS: NORMAL
LDL SERPL QN: 20.9 NM
LDL SERPL-SCNC: 1091 NMOL/L
LDL SMALL SERPL-SCNC: 419 NMOL/L
LDLC SERPL CALC-MCNC: 86 MG/DL (ref 0–99)
LDLC/HDLC SERPL: 1 RATIO (ref 0–3.2)
LP-IR SCORE SERPL: 44
NONHDLC SERPL-MCNC: 108 MG/DL (ref 0–129)
TRIGL SERPL-MCNC: 128 MG/DL (ref 0–149)

## 2023-11-13 ENCOUNTER — TELEPHONE (OUTPATIENT)
Age: 73
End: 2023-11-13

## 2023-11-13 NOTE — TELEPHONE ENCOUNTER
Spoke with suzette and she states she is not sure if the blood is in her urine or coming from her vagina.   Provided in office appt tomorrow at 11:00am.

## 2023-12-11 RX ORDER — LISINOPRIL AND HYDROCHLOROTHIAZIDE 25; 20 MG/1; MG/1
TABLET ORAL
Qty: 90 TABLET | Refills: 0 | Status: SHIPPED | OUTPATIENT
Start: 2023-12-11

## 2024-01-08 RX ORDER — AMLODIPINE BESYLATE 10 MG/1
TABLET ORAL
Qty: 90 TABLET | Refills: 0 | Status: SHIPPED | OUTPATIENT
Start: 2024-01-08

## 2024-03-11 RX ORDER — LISINOPRIL AND HYDROCHLOROTHIAZIDE 25; 20 MG/1; MG/1
TABLET ORAL
Qty: 90 TABLET | Refills: 0 | Status: SHIPPED | OUTPATIENT
Start: 2024-03-11

## 2024-04-02 ENCOUNTER — TELEPHONE (OUTPATIENT)
Age: 74
End: 2024-04-02

## 2024-04-02 RX ORDER — ROSUVASTATIN CALCIUM 20 MG/1
20 TABLET, COATED ORAL NIGHTLY
Qty: 90 TABLET | Refills: 1 | Status: SHIPPED | OUTPATIENT
Start: 2024-04-02

## 2024-04-02 NOTE — TELEPHONE ENCOUNTER
Refill per VO of Dr. Mae  Last appt: 8/31/2023    Future Appointments   Date Time Provider Department Center   9/5/2024  8:20 AM Joshua Mae MD [facility]       Requested Prescriptions     Signed Prescriptions Disp Refills    rosuvastatin (CRESTOR) 20 MG tablet 90 tablet 1     Sig: Take 1 tablet by mouth nightly     Authorizing Provider: JOSHUA MAE     Ordering User: JOHNATHON MURILLO

## 2024-04-05 DIAGNOSIS — I10 ESSENTIAL (PRIMARY) HYPERTENSION: Primary | ICD-10-CM

## 2024-04-05 RX ORDER — AMLODIPINE BESYLATE 10 MG/1
10 TABLET ORAL DAILY
Qty: 30 TABLET | Refills: 0 | Status: SHIPPED | OUTPATIENT
Start: 2024-04-05

## 2024-06-07 RX ORDER — LISINOPRIL AND HYDROCHLOROTHIAZIDE 25; 20 MG/1; MG/1
TABLET ORAL
Qty: 90 TABLET | Refills: 0 | Status: SHIPPED | OUTPATIENT
Start: 2024-06-07

## 2024-06-07 NOTE — TELEPHONE ENCOUNTER
Received medication refill from pharmacy     Medication: Lisinopril-HCTZ 20-25 mg tablets     QTY: 90    Direction: Take 1 tablet every day     Refills: 0     LOV: 10/12/23    Next appt: 09/05/24     Medication refill routed to provider

## 2024-07-06 DIAGNOSIS — I10 ESSENTIAL (PRIMARY) HYPERTENSION: ICD-10-CM

## 2024-07-08 RX ORDER — AMLODIPINE BESYLATE 10 MG/1
10 TABLET ORAL DAILY
Qty: 90 TABLET | Refills: 0 | Status: SHIPPED | OUTPATIENT
Start: 2024-07-08

## 2024-08-08 DIAGNOSIS — I10 ESSENTIAL (PRIMARY) HYPERTENSION: ICD-10-CM

## 2024-08-08 RX ORDER — AMLODIPINE BESYLATE 10 MG/1
10 TABLET ORAL DAILY
Qty: 30 TABLET | Refills: 0 | Status: SHIPPED | OUTPATIENT
Start: 2024-08-08

## 2024-09-04 RX ORDER — LISINOPRIL AND HYDROCHLOROTHIAZIDE 20; 25 MG/1; MG/1
TABLET ORAL
Qty: 90 TABLET | OUTPATIENT
Start: 2024-09-04

## 2024-09-04 RX ORDER — LISINOPRIL AND HYDROCHLOROTHIAZIDE 20; 25 MG/1; MG/1
TABLET ORAL
Qty: 30 TABLET | Refills: 0 | Status: SHIPPED | OUTPATIENT
Start: 2024-09-04

## 2024-09-06 ENCOUNTER — ANESTHESIA (OUTPATIENT)
Facility: HOSPITAL | Age: 74
End: 2024-09-06
Payer: MEDICARE

## 2024-09-06 ENCOUNTER — HOSPITAL ENCOUNTER (OUTPATIENT)
Facility: HOSPITAL | Age: 74
Setting detail: OUTPATIENT SURGERY
Discharge: HOME | End: 2024-09-06
Attending: INTERNAL MEDICINE | Admitting: INTERNAL MEDICINE
Payer: MEDICARE

## 2024-09-06 ENCOUNTER — ANESTHESIA EVENT (OUTPATIENT)
Facility: HOSPITAL | Age: 74
End: 2024-09-06
Payer: MEDICARE

## 2024-09-06 VITALS
WEIGHT: 149.91 LBS | TEMPERATURE: 98 F | HEART RATE: 75 BPM | OXYGEN SATURATION: 100 % | SYSTOLIC BLOOD PRESSURE: 117 MMHG | RESPIRATION RATE: 15 BRPM | DIASTOLIC BLOOD PRESSURE: 58 MMHG | HEIGHT: 63 IN | BODY MASS INDEX: 26.56 KG/M2

## 2024-09-06 PROCEDURE — 3700000000 HC ANESTHESIA ATTENDED CARE: Performed by: INTERNAL MEDICINE

## 2024-09-06 PROCEDURE — 3600007502: Performed by: INTERNAL MEDICINE

## 2024-09-06 PROCEDURE — 2709999900 HC NON-CHARGEABLE SUPPLY: Performed by: INTERNAL MEDICINE

## 2024-09-06 PROCEDURE — 7100000011 HC PHASE II RECOVERY - ADDTL 15 MIN: Performed by: INTERNAL MEDICINE

## 2024-09-06 PROCEDURE — 2500000003 HC RX 250 WO HCPCS: Performed by: NURSE ANESTHETIST, CERTIFIED REGISTERED

## 2024-09-06 PROCEDURE — 3600007512: Performed by: INTERNAL MEDICINE

## 2024-09-06 PROCEDURE — 7100000010 HC PHASE II RECOVERY - FIRST 15 MIN: Performed by: INTERNAL MEDICINE

## 2024-09-06 PROCEDURE — 88305 TISSUE EXAM BY PATHOLOGIST: CPT

## 2024-09-06 PROCEDURE — 6360000002 HC RX W HCPCS: Performed by: NURSE ANESTHETIST, CERTIFIED REGISTERED

## 2024-09-06 PROCEDURE — 3700000001 HC ADD 15 MINUTES (ANESTHESIA): Performed by: INTERNAL MEDICINE

## 2024-09-06 PROCEDURE — 2580000003 HC RX 258: Performed by: INTERNAL MEDICINE

## 2024-09-06 RX ORDER — DENOSUMAB 60 MG/ML
INJECTION SUBCUTANEOUS
COMMUNITY

## 2024-09-06 RX ORDER — LIDOCAINE HCL/PF 100 MG/5ML
SYRINGE (ML) INJECTION PRN
Status: DISCONTINUED | OUTPATIENT
Start: 2024-09-06 | End: 2024-09-06 | Stop reason: SDUPTHER

## 2024-09-06 RX ORDER — PROPOFOL 10 MG/ML
INJECTION, EMULSION INTRAVENOUS CONTINUOUS PRN
Status: DISCONTINUED | OUTPATIENT
Start: 2024-09-06 | End: 2024-09-06 | Stop reason: SDUPTHER

## 2024-09-06 RX ORDER — DEXMEDETOMIDINE HYDROCHLORIDE 100 UG/ML
INJECTION, SOLUTION INTRAVENOUS PRN
Status: DISCONTINUED | OUTPATIENT
Start: 2024-09-06 | End: 2024-09-06

## 2024-09-06 RX ORDER — PHENYLEPHRINE HCL IN 0.9% NACL 0.4MG/10ML
SYRINGE (ML) INTRAVENOUS PRN
Status: DISCONTINUED | OUTPATIENT
Start: 2024-09-06 | End: 2024-09-06 | Stop reason: SDUPTHER

## 2024-09-06 RX ORDER — SODIUM CHLORIDE 9 MG/ML
INJECTION, SOLUTION INTRAVENOUS CONTINUOUS
Status: DISCONTINUED | OUTPATIENT
Start: 2024-09-06 | End: 2024-09-06 | Stop reason: HOSPADM

## 2024-09-06 RX ORDER — EPHEDRINE SULFATE/0.9% NACL/PF 50 MG/5 ML
SYRINGE (ML) INTRAVENOUS PRN
Status: DISCONTINUED | OUTPATIENT
Start: 2024-09-06 | End: 2024-09-06 | Stop reason: SDUPTHER

## 2024-09-06 RX ADMIN — PROPOFOL 50 MG: 10 INJECTION, EMULSION INTRAVENOUS at 07:43

## 2024-09-06 RX ADMIN — LIDOCAINE HYDROCHLORIDE 40 MG: 20 INJECTION INTRAVENOUS at 07:42

## 2024-09-06 RX ADMIN — PROPOFOL 20 MG: 10 INJECTION, EMULSION INTRAVENOUS at 07:45

## 2024-09-06 RX ADMIN — LIDOCAINE HYDROCHLORIDE 40 MG: 20 INJECTION INTRAVENOUS at 07:43

## 2024-09-06 RX ADMIN — Medication 10 MG: at 07:54

## 2024-09-06 RX ADMIN — PROPOFOL 30 MG: 10 INJECTION, EMULSION INTRAVENOUS at 07:49

## 2024-09-06 RX ADMIN — Medication 5 MG: at 07:56

## 2024-09-06 RX ADMIN — SODIUM CHLORIDE: 9 INJECTION, SOLUTION INTRAVENOUS at 07:34

## 2024-09-06 RX ADMIN — PROPOFOL 140 MCG/KG/MIN: 10 INJECTION, EMULSION INTRAVENOUS at 07:42

## 2024-09-06 RX ADMIN — Medication 80 MCG: at 07:52

## 2024-09-06 ASSESSMENT — PAIN - FUNCTIONAL ASSESSMENT: PAIN_FUNCTIONAL_ASSESSMENT: 0-10

## 2024-09-06 NOTE — DISCHARGE INSTRUCTIONS
[facility]  Triston Zhu M.D.  [phone]          COLON DISCHARGE INSTRUCTIONS       2024    Angelina Kapoor  :  1950  [facility] Medical Record Number:  [id number]  COLONOSCOPY FINDINGS:  Your colonoscopy showed healed ulcerative colitis in the left colon, right colon and terminal ileum looked within normal. Biopsies were obtained.    DISCOMFORT:  Redness at IV site- apply warm compress to area; if redness or soreness persist- contact your physician  There may be a slight amount of blood passed from the rectum  Gaseous discomfort- walking, belching will help relieve any discomfort  You may not operate a vehicle for 12 hours  You may not engage in an occupation involving machinery or appliances for rest of today  You may not drink alcoholic beverages for at least 12 hours  Avoid making any critical decisions for at least 24 hour  DIET:   Regular diet   - however -  remember your colon is empty and a heavy meal will produce gas.   Avoid these foods:  vegetables, fried / greasy foods, carbonated drinks for today     ACTIVITY:  You may resume your normal daily activities it is recommended that you spend the remainder of the day resting -  avoid any strenuous activity.    CALL M.DIhsan  ANY SIGN OF:   Increasing pain, nausea, vomiting  Abdominal distension (swelling)  New increased bleeding (oral or rectal)  Fever (chills)  Pain in chest area  Bloody discharge from nose or mouth   Shortness of breath    Follow-up Instructions:   Call Dr. Zhu if any questions or problems.   Telephone # [phone]  Biopsy results will be available in  5 to 7 days  Follow-up office visit as scheduled.

## 2024-09-06 NOTE — ANESTHESIA PRE PROCEDURE
10/25/2023 08:48 AM    CL 99 10/25/2023 08:48 AM    CO2 25 10/25/2023 08:48 AM    BUN 19 10/25/2023 08:48 AM    CREATININE 0.95 10/25/2023 08:48 AM    GFRAA >60 08/05/2022 12:23 PM    AGRATIO 1.8 10/25/2023 08:48 AM    LABGLOM 62 10/25/2023 08:48 AM    GLUCOSE 93 10/25/2023 08:48 AM    CALCIUM 11.1 10/25/2023 08:48 AM    BILITOT 0.3 10/25/2023 08:48 AM    ALKPHOS 72 10/25/2023 08:48 AM    AST 19 10/25/2023 08:48 AM    ALT 11 10/25/2023 08:48 AM       POC Tests: No results for input(s): \"POCGLU\", \"POCNA\", \"POCK\", \"POCCL\", \"POCBUN\", \"POCHEMO\", \"POCHCT\" in the last 72 hours.    Coags: No results found for: \"PROTIME\", \"INR\", \"APTT\"    HCG (If Applicable): No results found for: \"PREGTESTUR\", \"PREGSERUM\", \"HCG\", \"HCGQUANT\"     ABGs: No results found for: \"PHART\", \"PO2ART\", \"OAY5GIE\", \"PKT1EJX\", \"BEART\", \"T2NTZGZB\"     Type & Screen (If Applicable):  No results found for: \"ABORH\", \"LABANTI\"    Drug/Infectious Status (If Applicable):  No results found for: \"HIV\", \"HEPCAB\"    COVID-19 Screening (If Applicable): No results found for: \"COVID19\"        Anesthesia Evaluation  Patient summary reviewed and Nursing notes reviewed   no history of anesthetic complications:   Airway: Mallampati: II          Dental: normal exam         Pulmonary:normal exam                               Cardiovascular:    (+) hypertension:                  Neuro/Psych:   Negative Neuro/Psych ROS              GI/Hepatic/Renal: Neg GI/Hepatic/Renal ROS  (+) PUD         ROS comment: Hx. Of UC.   Endo/Other: Negative Endo/Other ROS                     ROS comment: Hx. Of Breast CA, R side Abdominal: normal exam            Vascular: negative vascular ROS.         Other Findings:       Anesthesia Plan      MAC     ASA 2       Induction: intravenous.      Anesthetic plan and risks discussed with patient.      Plan discussed with CRNA.    Attending anesthesiologist reviewed and agrees with Preprocedure content            Esa Fletcher Jr, DO

## 2024-09-06 NOTE — ANESTHESIA POSTPROCEDURE EVALUATION
Department of Anesthesiology  Postprocedure Note    Patient: Angelina Kapoor  MRN: [id number]  YOB: 1950  Date of evaluation: 9/6/2024    Procedure Summary       Date: 09/06/24 Room / Location: [place] [address] / [facility] ENDOSCOPY    Anesthesia Start: 0736 Anesthesia Stop: 0801    Procedure: COLONOSCOPY WITH BIOPSY Diagnosis:       Universal ulcerative colitis with complication (HCC)      (Universal ulcerative colitis with complication (HCC) [K51.019])    Surgeons: Triston Zhu MD Responsible Provider: Esa Fletcher Jr., DO    Anesthesia Type: MAC, TIVA ASA Status: 2            Anesthesia Type: MAC, TIVA    Alvaro Phase I: Alvaro Score: 10    Alvaro Phase II: Alvaro Score: 9    Anesthesia Post Evaluation    Patient location during evaluation: PACU  Patient participation: complete - patient participated  Level of consciousness: awake and alert  Pain score: 0  Nausea & Vomiting: no nausea and no vomiting  Cardiovascular status: blood pressure returned to baseline and hemodynamically stable  Respiratory status: acceptable  Hydration status: euvolemic  Pain management: adequate    No notable events documented.

## 2024-09-06 NOTE — OP NOTE
[facility]  Triston Zhu M.D.  [phone]            2024          Colonoscopy Operative Report  Angelina Kapoor  :  1950  [facility] Medical Record Number:  [id number]      Indications:    Ulcerative colitis     :  Triston Zhu MD    Referring Provider: Manuela Jimenez MD    Sedation:  MAC anesthesia    Pre-Procedural Exam:      Airway: clear,  No airway problems anticipated  Heart: RRR, without gallops or rubs  Lungs: clear bilaterally without wheezes, crackles, or rhonchi  Abdomen: soft, nontender, nondistended, bowel sounds present  Mental Status: awake, alert and oriented to person, place and time     Procedure Details:  After informed consent was obtained with all risks and benefits of procedure explained and preoperative exam completed, the patient was taken to the endoscopy suite and placed in the left lateral decubitus position.  Upon sequential sedation as per above, a digital rectal exam was performed. The Olympus videocolonoscope  was inserted in the rectum and carefully advanced to the cecum, which was identified by the ileocecal valve and appendiceal orifice, terminal ileum.  The quality of preparation was good.  The colonoscope was slowly withdrawn with careful inspection and evaluation between folds. Retroflexion in the rectum was performed.    Findings:   Terminal Ileum: normal  Cecum: normal  Ascending Colon: normal  Transverse Colon: Few pseudopolyps noted in the distal transverse colon, otherwise no inflammation seen  Descending Colon: pseudopolyps and healed mucosa with post-inflammatory changes and scarring seen  Sigmoid: shortened with pseudopolyps and mucosal scarring and narrowing, no inflammation or lesions seen  Rectum: narrowed lumen, scarred mucosa, no inflammation or lesions seen.    Interventions:  none    Specimen Removed:   Right colon, left colon and rectum    Complications: None.     EBL:  None.    Impression:  Ulcerative colitis  changes seen in the left colon and rectum, right colon and terminal ileum within normal.    Recommendations:  -Await pathology.   -Low fat diet.    -Resume current medication(s)  -Follow-up office visit as scheduled    Discharge Disposition:  Home in the company of a  when able to ambulate.    Triston Zhu MD  9/6/2024  7:58 AM

## 2024-09-06 NOTE — H&P
The patient is a 73 year old female who presents with a complaint of Ulcerative Colitis.The patient presents for a medication refill. Note for \"Ulcerative Colitis\": Patient is a 72yo female with hx of UC presenting to office for follow up  She was started on humira in January and continued balsalazide until at maintance dosing of humira  colonoscopy needed this fall after being on humira for 6 months    She reports she is doing well on humira. She has two bowel movements per day which are solid bowel movements. No abd pain, urgency or hematochezia. she stopped balsalazine after getting to maintenance dosing of humira    She is up to date on flu and pneumonia vaccines. She is up to date on dexa scan    last tb and quantiferon gold were 10/2023      Problem List/Past Medical (Deidre Salmeron; 2024 8:11 AM)  Hypertension    Hypercholesterolemia    Breast Cancer    Ulcerative Colitis    History of colon polyps (Z86.010)    Ulcerative colitis, universal (K51.00)    Problems Reconciled      Past Surgical History (Deidre Salmeron; 2024 8:09 AM)  Mastectomy; Subtotal - Right    Melanoma Excision    Lumpectomy   Left.   Delivery      Allergies (Deidre Salmeron; 2024 8:09 AM)  Erythromycins      Medication History (Deidre Salmeron; 2024 8:12 AM)  balsalazide  (750mg capsule, 3 (three) oral BID, Taken starting 2023) Active.  folic acid  (1mg tablet, 1 (one) oral daily, Taken starting 2024) Active.  Humira(CF) Pen  (80mg/0.8 mL Pen Injector Ki, 1 (one) subcutaneous inject two (2) 80mg/ml pens on day one; inject one (1) 80mg pen on day 15, Taken starting 2023) Active.  Humira(CF)  (40mg/0.4 mL Syringe Kit, 1 (one) subcutaneous inject one (1) 40mg/ml prefilled pen every other week, Taken starting 2023) Active.  Vitamin D3  (1000UNIT tablet, Oral daily) Active.  Rosuvastatin Calcium  (10MG tablet, 1 Oral daily) Active.  Letrozole  (2.5MG tablet, Oral)

## 2024-09-27 ENCOUNTER — TELEPHONE (OUTPATIENT)
Age: 74
End: 2024-09-27

## 2024-09-27 RX ORDER — ROSUVASTATIN CALCIUM 20 MG/1
20 TABLET, COATED ORAL NIGHTLY
Qty: 90 TABLET | Refills: 0 | Status: SHIPPED | OUTPATIENT
Start: 2024-09-27

## 2024-09-27 NOTE — TELEPHONE ENCOUNTER
Patient  requested a call back due to her medication she is on her last pill and she stated it was suppose to be sent a week ago.     Contact Information  [phone] (Home Phone)   [phone] (Mobile)       rosuvastatin (CRESTOR) 20 MG

## 2024-10-01 RX ORDER — LISINOPRIL AND HYDROCHLOROTHIAZIDE 20; 25 MG/1; MG/1
TABLET ORAL
Qty: 30 TABLET | Refills: 0 | Status: SHIPPED | OUTPATIENT
Start: 2024-10-01

## 2024-10-01 RX ORDER — LISINOPRIL AND HYDROCHLOROTHIAZIDE 20; 25 MG/1; MG/1
TABLET ORAL
Qty: 90 TABLET | OUTPATIENT
Start: 2024-10-01

## 2024-10-11 ENCOUNTER — OFFICE VISIT (OUTPATIENT)
Age: 74
End: 2024-10-11
Payer: MEDICARE

## 2024-10-11 VITALS
SYSTOLIC BLOOD PRESSURE: 130 MMHG | BODY MASS INDEX: 26.58 KG/M2 | HEIGHT: 63 IN | RESPIRATION RATE: 18 BRPM | HEART RATE: 58 BPM | WEIGHT: 150 LBS | DIASTOLIC BLOOD PRESSURE: 62 MMHG | OXYGEN SATURATION: 98 %

## 2024-10-11 DIAGNOSIS — I10 ESSENTIAL HYPERTENSION: Primary | ICD-10-CM

## 2024-10-11 DIAGNOSIS — E78.5 HYPERLIPIDEMIA, UNSPECIFIED HYPERLIPIDEMIA TYPE: ICD-10-CM

## 2024-10-11 PROCEDURE — 3075F SYST BP GE 130 - 139MM HG: CPT | Performed by: SPECIALIST

## 2024-10-11 PROCEDURE — 1123F ACP DISCUSS/DSCN MKR DOCD: CPT | Performed by: SPECIALIST

## 2024-10-11 PROCEDURE — G8399 PT W/DXA RESULTS DOCUMENT: HCPCS | Performed by: SPECIALIST

## 2024-10-11 PROCEDURE — 3078F DIAST BP <80 MM HG: CPT | Performed by: SPECIALIST

## 2024-10-11 PROCEDURE — 93010 ELECTROCARDIOGRAM REPORT: CPT | Performed by: SPECIALIST

## 2024-10-11 PROCEDURE — G8427 DOCREV CUR MEDS BY ELIG CLIN: HCPCS | Performed by: SPECIALIST

## 2024-10-11 PROCEDURE — G8484 FLU IMMUNIZE NO ADMIN: HCPCS | Performed by: SPECIALIST

## 2024-10-11 PROCEDURE — 1036F TOBACCO NON-USER: CPT | Performed by: SPECIALIST

## 2024-10-11 PROCEDURE — 93005 ELECTROCARDIOGRAM TRACING: CPT | Performed by: SPECIALIST

## 2024-10-11 PROCEDURE — 1090F PRES/ABSN URINE INCON ASSESS: CPT | Performed by: SPECIALIST

## 2024-10-11 PROCEDURE — 99214 OFFICE O/P EST MOD 30 MIN: CPT | Performed by: SPECIALIST

## 2024-10-11 PROCEDURE — G8419 CALC BMI OUT NRM PARAM NOF/U: HCPCS | Performed by: SPECIALIST

## 2024-10-11 RX ORDER — ROSUVASTATIN CALCIUM 20 MG/1
20 TABLET, COATED ORAL NIGHTLY
Qty: 90 TABLET | Refills: 3 | Status: SHIPPED | OUTPATIENT
Start: 2024-10-11

## 2024-10-11 RX ORDER — AMLODIPINE BESYLATE 10 MG/1
10 TABLET ORAL DAILY
Qty: 90 TABLET | Refills: 3 | Status: SHIPPED | OUTPATIENT
Start: 2024-10-11

## 2024-10-11 RX ORDER — LISINOPRIL AND HYDROCHLOROTHIAZIDE 20; 25 MG/1; MG/1
1 TABLET ORAL DAILY
Qty: 90 TABLET | Refills: 3 | Status: SHIPPED | OUTPATIENT
Start: 2024-10-11

## 2024-10-11 NOTE — PROGRESS NOTES
Joshua Mae MD. [facility]          Patient: Angelina Kapoor  : 1950      Today's Date: 10/11/2024        HISTORY OF PRESENT ILLNESS:     History of Present Illness:    Doing well - no specific cardiac complaints   Had cancer - surgery and radiation summer 2024.   A little more tired lately.     PAST MEDICAL HISTORY:     Past Medical History:   Diagnosis Date    Agatston coronary artery calcium score less than 100     CT Heart scan  - CAC score 4    Cancer (HCC) Left    breast, s/p lumpectomy    Dyslipidemia     Labs 14 - chol 284, , HDL 73, , Apo B 146, LDL-P 2626, CRP 10    Endometrial cancer (HCC)     History of colon polyps     History of right mastectomy     HTN (hypertension)     Malignant melanoma nos Left    arm    Mild carotid artery disease (HCC)     Nausea & vomiting     Nausea only    Ulcerative colitis (HCC)        Past Surgical History:   Procedure Laterality Date    BREAST LUMPECTOMY Left     lumpectomy      SECTION      COLONOSCOPY N/A 2016    COLONOSCOPY performed by Triston Zhu MD at [facility] ENDOSCOPY    COLONOSCOPY      2009 polyp    COLONOSCOPY N/A 2023    COLONOSCOPY performed by Triston Zhu MD at [facility] ENDOSCOPY    COLONOSCOPY N/A 2023    COLONOSCOPY BIOPSY/STOMA performed by Triston Zhu MD at [facility] ENDOSCOPY    COLONOSCOPY N/A 2024    COLONOSCOPY WITH BIOPSY performed by Triston Zhu MD at [facility] ENDOSCOPY    HYSTERECTOMY (CERVIX STATUS UNKNOWN)      MASTECTOMY Right 2019             CURRENT MEDICATIONS:    .  Current Outpatient Medications   Medication Sig Dispense Refill    lisinopril-hydroCHLOROthiazide (PRINZIDE;ZESTORETIC) 20-25 MG per tablet TAKE 1 TABLET BY MOUTH EVERY DAY 30 tablet 0    rosuvastatin (CRESTOR) 20 MG tablet TAKE 1 TABLET BY MOUTH EVERY NIGHT 90 tablet 0    adalimumab (HUMIRA) 40 MG/0.8ML injection Inject 0.8 mLs into the skin      denosumab (PROLIA) 60 MG/ML SOSY SC injection Inject into the skin

## 2024-10-11 NOTE — PROGRESS NOTES
had concerns including Hypertension, Cholesterol Problem, and Annual Exam.    Vitals:    10/11/24 1019   BP: 130/62   Site: Left Upper Arm   Position: Sitting   Pulse: 58   Resp: 18   SpO2: 98%   Weight: 68 kg (150 lb)   Height: 1.6 m (5' 3\")        Chest pain No    Refills No        1. Have you been to the ER, urgent care clinic since your last visit? No       Hospitalized since your last visit? No       Where?        Date?

## 2024-10-29 RX ORDER — MECLIZINE HYDROCHLORIDE 25 MG/1
TABLET ORAL
Qty: 30 TABLET | Refills: 0 | OUTPATIENT
Start: 2024-10-29

## 2024-10-30 RX ORDER — LISINOPRIL AND HYDROCHLOROTHIAZIDE 20; 25 MG/1; MG/1
1 TABLET ORAL DAILY
Qty: 30 TABLET | OUTPATIENT
Start: 2024-10-30

## 2024-12-26 RX ORDER — ROSUVASTATIN CALCIUM 20 MG/1
20 TABLET, COATED ORAL NIGHTLY
Qty: 90 TABLET | Refills: 3 | OUTPATIENT
Start: 2024-12-26

## 2024-12-26 NOTE — TELEPHONE ENCOUNTER
Refill per VO of Dr. Mae  Last appt: 10/11/2024    Future Appointments   Date Time Provider Department Center   10/16/2025  9:20 AM Joshua Mae MD [facility]       Requested Prescriptions     Refused Prescriptions Disp Refills    rosuvastatin (CRESTOR) 20 MG tablet [Pharmacy Med Name: ROSUVASTATIN 20MG TABLETS] 90 tablet 3     Sig: TAKE 1 TABLET BY MOUTH EVERY NIGHT     Refused By: JHONATHON MURILLO     Reason for Refusal: Patient has requested refill too soon

## 2025-06-24 ENCOUNTER — OFFICE VISIT (OUTPATIENT)
Age: 75
End: 2025-06-24

## 2025-06-24 VITALS
WEIGHT: 149 LBS | SYSTOLIC BLOOD PRESSURE: 126 MMHG | HEART RATE: 59 BPM | BODY MASS INDEX: 26.4 KG/M2 | OXYGEN SATURATION: 96 % | HEIGHT: 63 IN | DIASTOLIC BLOOD PRESSURE: 80 MMHG | TEMPERATURE: 97.8 F | RESPIRATION RATE: 16 BRPM

## 2025-06-24 DIAGNOSIS — L74.0 HEAT RASH: Primary | ICD-10-CM

## 2025-06-24 ASSESSMENT — ENCOUNTER SYMPTOMS
EYES NEGATIVE: 1
RESPIRATORY NEGATIVE: 1
GASTROINTESTINAL NEGATIVE: 1
ALLERGIC/IMMUNOLOGIC NEGATIVE: 1

## 2025-06-24 NOTE — PROGRESS NOTES
2025   Angelina Kapoor (: 1950) is a 74 y.o. female, New patient, here for evaluation of the following chief complaint(s):  Rash (Pt states that she noticed a rash after taking a shower this morning. Pt states that the rash is located on her R lower to mid abdomen. Pt states that the rash is not causing her any discomfort. )     ASSESSMENT/PLAN:  Below is the assessment and plan developed based on review of pertinent history, physical exam, labs, studies, and medications.       Assessment & Plan  Heat rash   Vital signs stable  Alert and oriented x3    Patient presents with rash of unknown etiology.  Patient advised of treatment plan as indicated below, but discussed if symptoms persist or worsen, they will need to follow up with their PCP or a dermatologist to evaluate further.  Patient agreed.    Patient Instructions:  -Rest and drink plenty of fluids  -Zyrtec:  Take Zyrtec in the morning for a daytime antihistamine      Pt discharged with instructions to go to Emergency Department for sensation of throat closing, facial swelling, difficulty swallowing, difficulty breathing, shortness of breath, chest pain and uncontrolled fever above 100.4F.            Handout given with care instructions  OTC for symptom management. Increase fluid intake, ensure adequate nutritional intake.  Follow up with PCP as needed.  Go to ED with development of any acute symptoms.     Follow up:  No follow-ups on file.  Follow up immediately for any new, worsening or changes or if symptoms are not improving over the next 5-7 days.     SUBJECTIVE/OBJECTIVE:  HPI       Rash (Pt states that she noticed a rash after taking a shower this morning. Pt states that the rash is located on her R lower to mid abdomen. Pt states that the rash is not causing her any discomfort. )      No results found for any visits on 25.    No results found for this visit on 25.  XR Results (most recent):  @BSILASTIMGCAT([id number]:1)@

## (undated) DEVICE — SINGLE USE AIR/WATER, SUCTION AND BIOPSY VALVES SET: Brand: ORCAPOD™